# Patient Record
Sex: FEMALE | Race: WHITE | NOT HISPANIC OR LATINO | Employment: OTHER | URBAN - METROPOLITAN AREA
[De-identification: names, ages, dates, MRNs, and addresses within clinical notes are randomized per-mention and may not be internally consistent; named-entity substitution may affect disease eponyms.]

---

## 2019-04-12 ENCOUNTER — HOSPITAL ENCOUNTER (INPATIENT)
Facility: HOSPITAL | Age: 84
LOS: 4 days | Discharge: NURSING, REHAB OR LONG-TERM CARE | DRG: 481 | End: 2019-04-16
Attending: EMERGENCY MEDICINE | Admitting: ORTHOPAEDIC SURGERY
Payer: MEDICARE

## 2019-04-12 ENCOUNTER — ANESTHESIA EVENT (OUTPATIENT)
Dept: SURGERY | Facility: HOSPITAL | Age: 84
DRG: 481 | End: 2019-04-12
Payer: MEDICARE

## 2019-04-12 DIAGNOSIS — S52.572A OTHER CLOSED INTRA-ARTICULAR FRACTURE OF DISTAL END OF LEFT RADIUS, INITIAL ENCOUNTER: ICD-10-CM

## 2019-04-12 DIAGNOSIS — S72.22XA CLOSED DISPLACED SUBTROCHANTERIC FRACTURE OF LEFT FEMUR, INITIAL ENCOUNTER: Primary | ICD-10-CM

## 2019-04-12 DIAGNOSIS — S72.002A CLOSED FRACTURE OF LEFT HIP, INITIAL ENCOUNTER: ICD-10-CM

## 2019-04-12 DIAGNOSIS — Z01.818 PREOP EXAMINATION: ICD-10-CM

## 2019-04-12 LAB
ABO + RH BLD: NORMAL
ALBUMIN SERPL BCP-MCNC: 4 G/DL (ref 3.5–5.2)
ALP SERPL-CCNC: 58 U/L (ref 55–135)
ALT SERPL W/O P-5'-P-CCNC: 15 U/L (ref 10–44)
ANION GAP SERPL CALC-SCNC: 13 MMOL/L (ref 8–16)
APTT BLDCRRT: 21.7 SEC (ref 21–32)
AST SERPL-CCNC: 18 U/L (ref 10–40)
BASOPHILS # BLD AUTO: 0.01 K/UL (ref 0–0.2)
BASOPHILS NFR BLD: 0.2 % (ref 0–1.9)
BILIRUB SERPL-MCNC: 0.2 MG/DL (ref 0.1–1)
BILIRUB UR QL STRIP: NEGATIVE
BLD GP AB SCN CELLS X3 SERPL QL: NORMAL
BUN SERPL-MCNC: 21 MG/DL (ref 8–23)
CALCIUM SERPL-MCNC: 9.7 MG/DL (ref 8.7–10.5)
CHLORIDE SERPL-SCNC: 105 MMOL/L (ref 95–110)
CLARITY UR: CLEAR
CO2 SERPL-SCNC: 24 MMOL/L (ref 23–29)
COLOR UR: YELLOW
CREAT SERPL-MCNC: 0.8 MG/DL (ref 0.5–1.4)
DIFFERENTIAL METHOD: ABNORMAL
EOSINOPHIL # BLD AUTO: 0 K/UL (ref 0–0.5)
EOSINOPHIL NFR BLD: 0.5 % (ref 0–8)
ERYTHROCYTE [DISTWIDTH] IN BLOOD BY AUTOMATED COUNT: 13.2 % (ref 11.5–14.5)
EST. GFR  (AFRICAN AMERICAN): >60 ML/MIN/1.73 M^2
EST. GFR  (NON AFRICAN AMERICAN): >60 ML/MIN/1.73 M^2
GLUCOSE SERPL-MCNC: 189 MG/DL (ref 70–110)
GLUCOSE UR QL STRIP: NEGATIVE
HCT VFR BLD AUTO: 33.1 % (ref 37–48.5)
HGB BLD-MCNC: 10.8 G/DL (ref 12–16)
HGB UR QL STRIP: NEGATIVE
INR PPP: 1 (ref 0.8–1.2)
KETONES UR QL STRIP: NEGATIVE
LEUKOCYTE ESTERASE UR QL STRIP: ABNORMAL
LYMPHOCYTES # BLD AUTO: 1.2 K/UL (ref 1–4.8)
LYMPHOCYTES NFR BLD: 21.5 % (ref 18–48)
MCH RBC QN AUTO: 30.5 PG (ref 27–31)
MCHC RBC AUTO-ENTMCNC: 32.6 G/DL (ref 32–36)
MCV RBC AUTO: 94 FL (ref 82–98)
MICROSCOPIC COMMENT: ABNORMAL
MONOCYTES # BLD AUTO: 0.4 K/UL (ref 0.3–1)
MONOCYTES NFR BLD: 6.9 % (ref 4–15)
NEUTROPHILS # BLD AUTO: 3.9 K/UL (ref 1.8–7.7)
NEUTROPHILS NFR BLD: 70.5 % (ref 38–73)
NITRITE UR QL STRIP: NEGATIVE
PH UR STRIP: 6 [PH] (ref 5–8)
PLATELET # BLD AUTO: 178 K/UL (ref 150–350)
PMV BLD AUTO: 9.9 FL (ref 9.2–12.9)
POTASSIUM SERPL-SCNC: 4.2 MMOL/L (ref 3.5–5.1)
PROT SERPL-MCNC: 6.9 G/DL (ref 6–8.4)
PROT UR QL STRIP: NEGATIVE
PROTHROMBIN TIME: 10 SEC (ref 9–12.5)
RBC # BLD AUTO: 3.54 M/UL (ref 4–5.4)
SODIUM SERPL-SCNC: 142 MMOL/L (ref 136–145)
SP GR UR STRIP: >=1.03 (ref 1–1.03)
URN SPEC COLLECT METH UR: ABNORMAL
UROBILINOGEN UR STRIP-ACNC: NEGATIVE EU/DL
WBC # BLD AUTO: 5.53 K/UL (ref 3.9–12.7)
WBC #/AREA URNS HPF: 10 /HPF (ref 0–5)

## 2019-04-12 PROCEDURE — 85610 PROTHROMBIN TIME: CPT

## 2019-04-12 PROCEDURE — 80053 COMPREHEN METABOLIC PANEL: CPT

## 2019-04-12 PROCEDURE — 25000003 PHARM REV CODE 250: Performed by: ORTHOPAEDIC SURGERY

## 2019-04-12 PROCEDURE — 51702 INSERT TEMP BLADDER CATH: CPT

## 2019-04-12 PROCEDURE — 81000 URINALYSIS NONAUTO W/SCOPE: CPT

## 2019-04-12 PROCEDURE — 99285 EMERGENCY DEPT VISIT HI MDM: CPT | Mod: 25

## 2019-04-12 PROCEDURE — 96375 TX/PRO/DX INJ NEW DRUG ADDON: CPT

## 2019-04-12 PROCEDURE — 94761 N-INVAS EAR/PLS OXIMETRY MLT: CPT

## 2019-04-12 PROCEDURE — 11000001 HC ACUTE MED/SURG PRIVATE ROOM

## 2019-04-12 PROCEDURE — 86920 COMPATIBILITY TEST SPIN: CPT

## 2019-04-12 PROCEDURE — 86850 RBC ANTIBODY SCREEN: CPT

## 2019-04-12 PROCEDURE — 96374 THER/PROPH/DIAG INJ IV PUSH: CPT

## 2019-04-12 PROCEDURE — 85025 COMPLETE CBC W/AUTO DIFF WBC: CPT

## 2019-04-12 PROCEDURE — 93005 ELECTROCARDIOGRAM TRACING: CPT

## 2019-04-12 PROCEDURE — 93010 ELECTROCARDIOGRAM REPORT: CPT | Mod: ,,, | Performed by: INTERNAL MEDICINE

## 2019-04-12 PROCEDURE — 85730 THROMBOPLASTIN TIME PARTIAL: CPT

## 2019-04-12 PROCEDURE — 93010 EKG 12-LEAD: ICD-10-PCS | Mod: ,,, | Performed by: INTERNAL MEDICINE

## 2019-04-12 PROCEDURE — 63600175 PHARM REV CODE 636 W HCPCS: Performed by: EMERGENCY MEDICINE

## 2019-04-12 RX ORDER — ONDANSETRON 2 MG/ML
4 INJECTION INTRAMUSCULAR; INTRAVENOUS EVERY 8 HOURS PRN
Status: DISCONTINUED | OUTPATIENT
Start: 2019-04-12 | End: 2019-04-16 | Stop reason: HOSPADM

## 2019-04-12 RX ORDER — LEVOTHYROXINE SODIUM 50 UG/1
50 TABLET ORAL DAILY
Status: DISCONTINUED | OUTPATIENT
Start: 2019-04-13 | End: 2019-04-16 | Stop reason: HOSPADM

## 2019-04-12 RX ORDER — MORPHINE SULFATE 2 MG/ML
3 INJECTION, SOLUTION INTRAMUSCULAR; INTRAVENOUS
Status: DISCONTINUED | OUTPATIENT
Start: 2019-04-12 | End: 2019-04-16 | Stop reason: HOSPADM

## 2019-04-12 RX ORDER — HYDROCODONE BITARTRATE AND ACETAMINOPHEN 10; 325 MG/1; MG/1
1 TABLET ORAL EVERY 4 HOURS PRN
Status: DISCONTINUED | OUTPATIENT
Start: 2019-04-12 | End: 2019-04-16 | Stop reason: HOSPADM

## 2019-04-12 RX ORDER — MORPHINE SULFATE 2 MG/ML
2 INJECTION, SOLUTION INTRAMUSCULAR; INTRAVENOUS
Status: COMPLETED | OUTPATIENT
Start: 2019-04-12 | End: 2019-04-12

## 2019-04-12 RX ORDER — LEVOTHYROXINE SODIUM 50 UG/1
50 TABLET ORAL DAILY
COMMUNITY

## 2019-04-12 RX ORDER — OLMESARTAN MEDOXOMIL 20 MG/1
10 TABLET ORAL DAILY
COMMUNITY

## 2019-04-12 RX ORDER — PRAVASTATIN SODIUM 20 MG/1
40 TABLET ORAL DAILY
Status: DISCONTINUED | OUTPATIENT
Start: 2019-04-13 | End: 2019-04-16 | Stop reason: HOSPADM

## 2019-04-12 RX ORDER — SODIUM CHLORIDE, SODIUM LACTATE, POTASSIUM CHLORIDE, CALCIUM CHLORIDE 600; 310; 30; 20 MG/100ML; MG/100ML; MG/100ML; MG/100ML
INJECTION, SOLUTION INTRAVENOUS CONTINUOUS
Status: DISCONTINUED | OUTPATIENT
Start: 2019-04-12 | End: 2019-04-15

## 2019-04-12 RX ORDER — AMOXICILLIN 250 MG
1 CAPSULE ORAL 2 TIMES DAILY
Status: DISCONTINUED | OUTPATIENT
Start: 2019-04-12 | End: 2019-04-16 | Stop reason: HOSPADM

## 2019-04-12 RX ORDER — ENOXAPARIN SODIUM 100 MG/ML
30 INJECTION SUBCUTANEOUS EVERY 12 HOURS
Status: DISCONTINUED | OUTPATIENT
Start: 2019-04-13 | End: 2019-04-16 | Stop reason: HOSPADM

## 2019-04-12 RX ORDER — HYDROCODONE BITARTRATE AND ACETAMINOPHEN 5; 325 MG/1; MG/1
1 TABLET ORAL EVERY 4 HOURS PRN
Status: DISCONTINUED | OUTPATIENT
Start: 2019-04-12 | End: 2019-04-16 | Stop reason: HOSPADM

## 2019-04-12 RX ORDER — LOSARTAN POTASSIUM 25 MG/1
25 TABLET ORAL DAILY
Status: DISCONTINUED | OUTPATIENT
Start: 2019-04-13 | End: 2019-04-16 | Stop reason: HOSPADM

## 2019-04-12 RX ORDER — ACETAMINOPHEN 325 MG/1
650 TABLET ORAL EVERY 8 HOURS PRN
Status: DISCONTINUED | OUTPATIENT
Start: 2019-04-12 | End: 2019-04-16 | Stop reason: HOSPADM

## 2019-04-12 RX ORDER — CEFAZOLIN SODIUM 2 G/50ML
2 SOLUTION INTRAVENOUS
Status: DISCONTINUED | OUTPATIENT
Start: 2019-04-12 | End: 2019-04-13

## 2019-04-12 RX ORDER — PRAVASTATIN SODIUM 40 MG/1
40 TABLET ORAL DAILY
COMMUNITY

## 2019-04-12 RX ORDER — ONDANSETRON 2 MG/ML
4 INJECTION INTRAMUSCULAR; INTRAVENOUS
Status: COMPLETED | OUTPATIENT
Start: 2019-04-12 | End: 2019-04-12

## 2019-04-12 RX ORDER — CALCIUM CARBONATE 600 MG
600 TABLET ORAL ONCE
COMMUNITY

## 2019-04-12 RX ADMIN — STANDARDIZED SENNA CONCENTRATE AND DOCUSATE SODIUM 1 TABLET: 8.6; 5 TABLET, FILM COATED ORAL at 08:04

## 2019-04-12 RX ADMIN — SODIUM CHLORIDE, SODIUM LACTATE, POTASSIUM CHLORIDE, AND CALCIUM CHLORIDE 100 ML/HR: .6; .31; .03; .02 INJECTION, SOLUTION INTRAVENOUS at 06:04

## 2019-04-12 RX ADMIN — ONDANSETRON 4 MG: 2 INJECTION INTRAMUSCULAR; INTRAVENOUS at 04:04

## 2019-04-12 RX ADMIN — MORPHINE SULFATE 2 MG: 2 INJECTION, SOLUTION INTRAMUSCULAR; INTRAVENOUS at 04:04

## 2019-04-12 RX ADMIN — ACETAMINOPHEN 650 MG: 325 TABLET ORAL at 06:04

## 2019-04-12 NOTE — H&P
[facility] H&P    Consult:   L hip fx    HPI:  85yF was dancing at a wedding and fell onto L hip and sustained a L hip subtroch fx. Denies any numbness/ tingling LLE. Pain localized only to L hip. No f/c    PMH:   Past Medical History:   Diagnosis Date    Coronary artery disease     Diabetes mellitus     Borderline , not on any treatment    Hypertension     Thyroid disease        PSH:    has a past surgical history that includes L Knee replacement (Left).    SOCIAL:   Social History     Socioeconomic History    Marital status:      Spouse name: Not on file    Number of children: Not on file    Years of education: Not on file    Highest education level: Not on file   Occupational History    Not on file   Social Needs    Financial resource strain: Not on file    Food insecurity:     Worry: Not on file     Inability: Not on file    Transportation needs:     Medical: Not on file     Non-medical: Not on file   Tobacco Use    Smoking status: Not on file   Substance and Sexual Activity    Alcohol use: Not on file    Drug use: Not on file    Sexual activity: Not on file   Lifestyle    Physical activity:     Days per week: Not on file     Minutes per session: Not on file    Stress: Not on file   Relationships    Social connections:     Talks on phone: Not on file     Gets together: Not on file     Attends Religion service: Not on file     Active member of club or organization: Not on file     Attends meetings of clubs or organizations: Not on file     Relationship status: Not on file   Other Topics Concern    Not on file   Social History Narrative    Not on file            MEDS:   No current facility-administered medications on file prior to encounter.      Current Outpatient Medications on File Prior to Encounter   Medication Sig Dispense Refill    calcium carbonate (OS-YANETH) 600 mg calcium (1,500 mg) Tab Take 600 mg by mouth once.      levothyroxine (SYNTHROID) 50 MCG tablet Take 50 mcg by  mouth once daily.      olmesartan (BENICAR) 20 MG tablet Take 10 mg by mouth once daily.      pravastatin (PRAVACHOL) 40 MG tablet Take 40 mg by mouth once daily.         ALLERGY:   Allergies as of 04/12/2019    (No Known Allergies)       LAST MEAL: 4/12 noon    Vitals:  BP (!) 148/67   Pulse 68   Temp 98.6 °F (37 °C) (Oral)   Resp 19   SpO2 99%     Labs:  Recent Labs   Lab 04/12/19  1529   WBC 5.53   HGB 10.8*   HCT 33.1*      MCV 94   RDW 13.2      K 4.2      CO2 24   BUN 21   CREATININE 0.8   *   PROT 6.9   ALBUMIN 4.0   BILITOT 0.2   AST 18   ALKPHOS 58   ALT 15       Coags:   Lab Results   Component Value Date    INR 1.0 04/12/2019    APTT 21.7 04/12/2019         Exam:  NAD  No resp dist  RRR per DP pulse    LLE:  DP 2+  SILT t/s/s/dp/sp  Motor intact ta/gs/ehl/fhl  No wounds over hip  Well healed L knee incision consistent w/ TKA  Pain w/ logroll     Radiology:  XR L hip: L subtroch femur fx    Impression:  85yF w/ L subtroch femur fx above a L TKA    Plan:  Will order XR L femur and L knee  NWB LLE  R/b/a of op and non-op discussed. Elected to proceed w/ op intervention  - Plan for OR tomorrow for L CMN first case  - Regular diet now, NPo after midnight  -  Escobedo insertion  - H/H 10.8/33  - DVT ppx - lovenox  - Abx - Ancef on call for OR  Weight Bearing Status: Bedrest    I agree with findings outlined by the resident.   I met and examined the patient. Plan for CMN left femur this morning.

## 2019-04-12 NOTE — SIGNIFICANT EVENT
Family medicine was called to evaluate Pola Shah by the ED.      Date for Evaluation: 4/12/2019      HPI: I had seen this pleasant 85 y.o. female in the ED today for evaluation of left hip fracture  Sustained today while dancing     Active Cardiac Conditions: Her history is not suggestive of unstable coronary syndrome, decompensated heart failure, significant arrhythmias, severe valvular disease.    Exercise Tolerance: She can undertake activities such as cooking, cleaning, vacuuming, showering, dressing, shopping, walking 1-2 blocks, flights of stairs, golfing and mowing without chest pain or shortness of breath making her exercise tolerance more than 4 METs.     Clinical Cardiac Risk Factors: She does not have a history of Coronary Arterty Disease, Congestive Heart Failure, Cerebral Vascular Disease, Diabetes Mellitus and Renal Impairment    Current Anticoagulants and Antiplatelet Therapy: No    ROS    Constitutional: Appetite and weight stable.  HEENT: No double, blurring of vision.   Cardiac: As stated above.  Respiratory: Negative for cough, phlegm, shortness of breath.  GI: Negative for nausea, vomiting, diarrhea.  Gu: No Dysuria, no Hematuria.  MS: Muscle ache: Back pain, joint pain/swelling.  Neuro: No numbness, tingling or weakness.  Endocrine: Not a Diabetic.  Derm: No rashes.  Heme/Onc:  No easy bruising. No bleeding complications with previous surgeries.        Past Surgical History:   Procedure Laterality Date    L Knee replacement Left        SOCIAL HISTORY  Social History     Tobacco Use    Smoking status: Not on file   Substance Use Topics    Alcohol use: Not on file    Drug use: Not on file       History reviewed. No pertinent family history.         MEDICATION HISTORY  No current facility-administered medications for this encounter.      Current Outpatient Medications   Medication Sig    calcium carbonate (OS-YANETH) 600 mg calcium (1,500 mg) Tab Take 600 mg by mouth once.     levothyroxine (SYNTHROID) 50 MCG tablet Take 50 mcg by mouth once daily.    olmesartan (BENICAR) 20 MG tablet Take 10 mg by mouth once daily.    pravastatin (PRAVACHOL) 40 MG tablet Take 40 mg by mouth once daily.       ALLERGIES   Review of patient's allergies indicates:  No Known Allergies     VITALS  Vitals:    04/12/19 1427   BP: (!) 146/67   Pulse: 67   Resp: 15   Temp: 97.8 °F (36.6 °C)         PHYSICAL EXAMINATION    General:  Well developed, well nourished, no acute distress   Eye: Pupils equal, reactive to light, no conjunctival icterus.   Neck: No thyromegaly, carotid bruit, JVD  Heart: Normal S1 S2, no murmurs, or gallops  Respiratory: normal respiratory effort, bilateral equal air entry.   Abdomen: Soft, non-tender, liver and spleen not palpable  Lymphatic: No cervical, axillary, inguinal, lymphadenopathy   Musculoskeletal: left leg shortened and externally rotated   Neurological: Conscious, coherent, equal strength, bilaterally on both upper and lower extremities    LAB STUDIES    Blood Tests    CBC:  Recent Labs     04/12/19  1529   WBC 5.53   HGB 10.8*   HCT 33.1*          No results for input(s): CREATININE in the last 72 hours.      Coags:  No results for input(s): INR, APTT in the last 72 hours.    Invalid input(s): PRO    EKG: reviewed by me         ASSESSMENT AND PLAN    Pola Shah was seen in the pre-operative clinic today.    Pre-operative cardiac risk assessment:     She does not have any active cardiac conditions, clinical cardiac risk factors and her exercise tolerance is more than 4 METS.   She  carries an intermediate cardiac risk and she is at intermediate risk for the procedure.     Family medicine can be consulted for the management of hypertension    D'Amico C Johnson, MD  4/12/2019  [facility] HO-1

## 2019-04-12 NOTE — ED PROVIDER NOTES
Encounter Date: 4/12/2019    SCRIBE #1 NOTE: I, Hermilo Mayfield, am scribing for, and in the presence of,  Dr. Knott. I have scribed the entire note.       History     Chief Complaint   Patient presents with    Hip Pain     left hip injury after fall at a hotel. + shortening noted. pt immobilized pta.      Pola Shah is a 85 y.o. female who presents to the ED due to hip pain s/p fall with onset just PTA. The patient's family reports the patient slipped and fell while at a wedding at a hotel. The patient presents with left hip pain with shortening and arrived immobilized.    The history is provided by the patient. The history is limited by a language barrier.     Review of patient's allergies indicates:  No Known Allergies  Past Medical History:   Diagnosis Date    Coronary artery disease     Diabetes mellitus     Borderline , not on any treatment    Hypertension     Thyroid disease      Past Surgical History:   Procedure Laterality Date    L Knee replacement Left      History reviewed. No pertinent family history.  Social History     Tobacco Use    Smoking status: Not on file   Substance Use Topics    Alcohol use: Not on file    Drug use: Not on file     Review of Systems   Constitutional: Negative for fever.   HENT: Negative for facial swelling and sore throat.    Eyes: Negative for pain and redness.   Respiratory: Negative for shortness of breath.    Cardiovascular: Negative for chest pain.   Gastrointestinal: Negative for abdominal pain and nausea.   Genitourinary: Negative for dysuria and hematuria.   Musculoskeletal: Negative for back pain and neck pain.        Left hip pain   Skin: Negative for rash.   Neurological: Negative for seizures and facial asymmetry.       Physical Exam     Initial Vitals [04/12/19 1427]   BP Pulse Resp Temp SpO2   (!) 146/67 67 15 97.8 °F (36.6 °C) 100 %      MAP       --         Physical Exam    Nursing note and vitals reviewed.  Constitutional: She appears  well-developed and well-nourished. She is not diaphoretic. No distress.   HENT:   Head: Normocephalic and atraumatic.   Eyes: Conjunctivae and EOM are normal.   Neck: Normal range of motion. Neck supple.   Cardiovascular: Normal rate.   Pulmonary/Chest: Breath sounds normal. No respiratory distress.   Abdominal: Soft. There is no tenderness.   Musculoskeletal: She exhibits tenderness (To left hip area).   Left leg shortened and externally rotated   Neurological: She is alert and oriented to person, place, and time.   Skin: Skin is warm and dry.         ED Course   Procedures  Labs Reviewed   COMPREHENSIVE METABOLIC PANEL - Abnormal; Notable for the following components:       Result Value    Glucose 189 (*)     All other components within normal limits   CBC W/ AUTO DIFFERENTIAL - Abnormal; Notable for the following components:    RBC 3.54 (*)     Hemoglobin 10.8 (*)     Hematocrit 33.1 (*)     All other components within normal limits   PROTIME-INR   APTT   URINALYSIS, REFLEX TO URINE CULTURE   TYPE & SCREEN        ECG Results          EKG 12-lead (In process)  Result time 04/12/19 16:16:51    In process by Interface, Lab In [facility] (04/12/19 16:16:51)                 Narrative:    Test Reason : Z01.818,    Vent. Rate : 061 BPM     Atrial Rate : 061 BPM     P-R Int : 198 ms          QRS Dur : 076 ms      QT Int : 402 ms       P-R-T Axes : 053 047 050 degrees     QTc Int : 404 ms    Normal sinus rhythm  Septal infarct ,age undetermined  Abnormal ECG  No previous ECGs available    Referred By: [email or web address]   SELF           Confirmed By:                             Imaging Results          X-Ray Hip 1 View Left (Final result)  Result time 04/12/19 15:45:11    Final result by Gumaro Jackson Jr., MD (04/12/19 15:45:11)                 Impression:      Comminuted intertrochanteric fracture as above.      Electronically signed by: Gumaro Jackson MD  Date:    04/12/2019  Time:    15:45             Narrative:     EXAMINATION:  XR HIP 1 VIEW LEFT    CLINICAL HISTORY:  fall;    TECHNIQUE:  Single AP view left hip.    COMPARISON:  None    FINDINGS:  There is a comminuted intertrochanteric fracture.  The lesser and greater trochanters appear to be free fragments.  Femoral head remains within the acetabulum on this single projection.                                 Medical Decision Making:   Clinical Tests:   Lab Tests: Ordered and Reviewed  The following lab test(s) were unremarkable: CBC, CMP, Urinalysis and Troponin  Radiological Study: Reviewed and Ordered  Medical Tests: Ordered and Reviewed                   ED Course as of Apr 12 1708 Fri Apr 12, 2019 1707 Case discussed with [facility].  They recommend consulting [facility].  [facility] consulted.  They came and evaluated the patient and states the patient is medically cleared for surgery.  This information was relayed to the [facility] resident.    [ST]      ED Course User Index  [ST] Radha Knott MD     Clinical Impression:       ICD-10-CM ICD-9-CM   1. Closed fracture of left hip, initial encounter S72.002A 820.8   2. Preop examination Z01.818 V72.84             I, Radha Knott, personally performed the services described in this documentation. All medical record entries made by the scribe were at my direction and in my presence.  I have reviewed the chart and agree that the record reflects my personal performance and is accurate and complete. Radha Knott M.D. 5:07 PM04/12/2019               Radha Knott MD  04/12/19 1708

## 2019-04-12 NOTE — ED NOTES
APPEARANCE: Alert, oriented and in no acute distress.  CARDIAC: Normal rate and rhythm, no murmur heard.   PERIPHERAL VASCULAR: peripheral pulses present. Normal cap refill. No edema. Warm to touch.Pedal pulses present.  RESPIRATORY:Normal rate and effort, breath sounds clear bilaterally throughout chest. Respirations are equal and unlabored no obvious signs of distress.  GASTRO: soft, bowel sounds normal, no tenderness, no abdominal distention.  MUSC: Full ROM. No bony tenderness or soft tissue tenderness. No obvious deformity.  SKIN: Skin is warm and dry, normal skin turgor, mucous membranes moist.  NEURO: 5/5 strength major flexors/extensors bilaterally. Sensory intact to light touch bilaterally. Tierra coma scale: eyes open spontaneously-4, oriented & converses-5, obeys commands-6. No neurological abnormalities.   MENTAL STATUS: awake, alert and aware of environment.  EYE: PERRL, both eyes: pupils brisk and reactive to light. Normal size.  ENT: EARS: no obvious drainage. NOSE: no active bleeding.

## 2019-04-12 NOTE — ED NOTES
Kerry consulted re : transfer back to [place] post op.  aware , transfer facilitator will discuss with the family and facilitate transfer once the patient is on the floor post op. Family aware.

## 2019-04-13 ENCOUNTER — ANESTHESIA (OUTPATIENT)
Dept: SURGERY | Facility: HOSPITAL | Age: 84
DRG: 481 | End: 2019-04-13
Payer: MEDICARE

## 2019-04-13 LAB
ANION GAP SERPL CALC-SCNC: 10 MMOL/L (ref 8–16)
BASOPHILS # BLD AUTO: 0.01 K/UL (ref 0–0.2)
BASOPHILS NFR BLD: 0.1 % (ref 0–1.9)
BUN SERPL-MCNC: 13 MG/DL (ref 8–23)
CALCIUM SERPL-MCNC: 8.6 MG/DL (ref 8.7–10.5)
CHLORIDE SERPL-SCNC: 104 MMOL/L (ref 95–110)
CO2 SERPL-SCNC: 24 MMOL/L (ref 23–29)
CREAT SERPL-MCNC: 0.8 MG/DL (ref 0.5–1.4)
DIFFERENTIAL METHOD: ABNORMAL
EOSINOPHIL # BLD AUTO: 0 K/UL (ref 0–0.5)
EOSINOPHIL NFR BLD: 0.2 % (ref 0–8)
ERYTHROCYTE [DISTWIDTH] IN BLOOD BY AUTOMATED COUNT: 13.3 % (ref 11.5–14.5)
EST. GFR  (AFRICAN AMERICAN): >60 ML/MIN/1.73 M^2
EST. GFR  (NON AFRICAN AMERICAN): >60 ML/MIN/1.73 M^2
GLUCOSE SERPL-MCNC: 169 MG/DL (ref 70–110)
HCT VFR BLD AUTO: 28.5 % (ref 37–48.5)
HGB BLD-MCNC: 9.2 G/DL (ref 12–16)
LYMPHOCYTES # BLD AUTO: 1.3 K/UL (ref 1–4.8)
LYMPHOCYTES NFR BLD: 12.5 % (ref 18–48)
MCH RBC QN AUTO: 30.5 PG (ref 27–31)
MCHC RBC AUTO-ENTMCNC: 32.3 G/DL (ref 32–36)
MCV RBC AUTO: 94 FL (ref 82–98)
MONOCYTES # BLD AUTO: 0.7 K/UL (ref 0.3–1)
MONOCYTES NFR BLD: 6.4 % (ref 4–15)
NEUTROPHILS # BLD AUTO: 8.4 K/UL (ref 1.8–7.7)
NEUTROPHILS NFR BLD: 80.3 % (ref 38–73)
PLATELET # BLD AUTO: 147 K/UL (ref 150–350)
PMV BLD AUTO: 8.9 FL (ref 9.2–12.9)
POTASSIUM SERPL-SCNC: 3.8 MMOL/L (ref 3.5–5.1)
RBC # BLD AUTO: 3.02 M/UL (ref 4–5.4)
SODIUM SERPL-SCNC: 138 MMOL/L (ref 136–145)
WBC # BLD AUTO: 10.42 K/UL (ref 3.9–12.7)

## 2019-04-13 PROCEDURE — 80048 BASIC METABOLIC PNL TOTAL CA: CPT

## 2019-04-13 PROCEDURE — 97530 THERAPEUTIC ACTIVITIES: CPT

## 2019-04-13 PROCEDURE — 36415 COLL VENOUS BLD VENIPUNCTURE: CPT

## 2019-04-13 PROCEDURE — 37000008 HC ANESTHESIA 1ST 15 MINUTES: Performed by: ORTHOPAEDIC SURGERY

## 2019-04-13 PROCEDURE — 63600175 PHARM REV CODE 636 W HCPCS: Performed by: ANESTHESIOLOGY

## 2019-04-13 PROCEDURE — 63600175 PHARM REV CODE 636 W HCPCS: Performed by: ORTHOPAEDIC SURGERY

## 2019-04-13 PROCEDURE — C1769 GUIDE WIRE: HCPCS | Performed by: ORTHOPAEDIC SURGERY

## 2019-04-13 PROCEDURE — 37000009 HC ANESTHESIA EA ADD 15 MINS: Performed by: ORTHOPAEDIC SURGERY

## 2019-04-13 PROCEDURE — 27000221 HC OXYGEN, UP TO 24 HOURS

## 2019-04-13 PROCEDURE — 85025 COMPLETE CBC W/AUTO DIFF WBC: CPT

## 2019-04-13 PROCEDURE — 71000033 HC RECOVERY, INTIAL HOUR: Performed by: ORTHOPAEDIC SURGERY

## 2019-04-13 PROCEDURE — 25000003 PHARM REV CODE 250: Performed by: ANESTHESIOLOGY

## 2019-04-13 PROCEDURE — 36000711: Performed by: ORTHOPAEDIC SURGERY

## 2019-04-13 PROCEDURE — 36000710: Performed by: ORTHOPAEDIC SURGERY

## 2019-04-13 PROCEDURE — 25000003 PHARM REV CODE 250: Performed by: ORTHOPAEDIC SURGERY

## 2019-04-13 PROCEDURE — 27201423 OPTIME MED/SURG SUP & DEVICES STERILE SUPPLY: Performed by: ORTHOPAEDIC SURGERY

## 2019-04-13 PROCEDURE — 97163 PT EVAL HIGH COMPLEX 45 MIN: CPT

## 2019-04-13 PROCEDURE — 11000001 HC ACUTE MED/SURG PRIVATE ROOM

## 2019-04-13 PROCEDURE — C1713 ANCHOR/SCREW BN/BN,TIS/BN: HCPCS | Performed by: ORTHOPAEDIC SURGERY

## 2019-04-13 PROCEDURE — 71000039 HC RECOVERY, EACH ADD'L HOUR: Performed by: ORTHOPAEDIC SURGERY

## 2019-04-13 DEVICE — SCREW LOCKING T2 F/T 5X47.5MM: Type: IMPLANTABLE DEVICE | Site: HIP | Status: FUNCTIONAL

## 2019-04-13 DEVICE — KIT NAIL IM GAMMA 11X380MM TI: Type: IMPLANTABLE DEVICE | Site: HIP | Status: FUNCTIONAL

## 2019-04-13 DEVICE — SCREW LAG TITANIUM 10.5X95: Type: IMPLANTABLE DEVICE | Site: HIP | Status: FUNCTIONAL

## 2019-04-13 RX ORDER — ONDANSETRON 2 MG/ML
4 INJECTION INTRAMUSCULAR; INTRAVENOUS DAILY PRN
Status: DISCONTINUED | OUTPATIENT
Start: 2019-04-13 | End: 2019-04-13 | Stop reason: HOSPADM

## 2019-04-13 RX ORDER — DEXAMETHASONE SODIUM PHOSPHATE 4 MG/ML
INJECTION, SOLUTION INTRA-ARTICULAR; INTRALESIONAL; INTRAMUSCULAR; INTRAVENOUS; SOFT TISSUE
Status: DISCONTINUED | OUTPATIENT
Start: 2019-04-13 | End: 2019-04-13

## 2019-04-13 RX ORDER — LIDOCAINE HCL/PF 100 MG/5ML
SYRINGE (ML) INTRAVENOUS
Status: DISCONTINUED | OUTPATIENT
Start: 2019-04-13 | End: 2019-04-13

## 2019-04-13 RX ORDER — ROCURONIUM BROMIDE 10 MG/ML
INJECTION, SOLUTION INTRAVENOUS
Status: DISCONTINUED | OUTPATIENT
Start: 2019-04-13 | End: 2019-04-13

## 2019-04-13 RX ORDER — SODIUM CHLORIDE 0.9 % (FLUSH) 0.9 %
10 SYRINGE (ML) INJECTION
Status: DISCONTINUED | OUTPATIENT
Start: 2019-04-13 | End: 2019-04-13 | Stop reason: HOSPADM

## 2019-04-13 RX ORDER — SUCCINYLCHOLINE CHLORIDE 20 MG/ML
INJECTION INTRAMUSCULAR; INTRAVENOUS
Status: DISCONTINUED | OUTPATIENT
Start: 2019-04-13 | End: 2019-04-13

## 2019-04-13 RX ORDER — FENTANYL CITRATE 50 UG/ML
INJECTION, SOLUTION INTRAMUSCULAR; INTRAVENOUS
Status: DISCONTINUED | OUTPATIENT
Start: 2019-04-13 | End: 2019-04-13

## 2019-04-13 RX ORDER — KETOROLAC TROMETHAMINE 30 MG/ML
15 INJECTION, SOLUTION INTRAMUSCULAR; INTRAVENOUS EVERY 6 HOURS
Status: COMPLETED | OUTPATIENT
Start: 2019-04-13 | End: 2019-04-14

## 2019-04-13 RX ORDER — SODIUM CHLORIDE, SODIUM LACTATE, POTASSIUM CHLORIDE, CALCIUM CHLORIDE 600; 310; 30; 20 MG/100ML; MG/100ML; MG/100ML; MG/100ML
INJECTION, SOLUTION INTRAVENOUS CONTINUOUS PRN
Status: DISCONTINUED | OUTPATIENT
Start: 2019-04-13 | End: 2019-04-13

## 2019-04-13 RX ORDER — GLYCOPYRROLATE 0.2 MG/ML
INJECTION INTRAMUSCULAR; INTRAVENOUS
Status: DISCONTINUED | OUTPATIENT
Start: 2019-04-13 | End: 2019-04-13

## 2019-04-13 RX ORDER — HYDROMORPHONE HYDROCHLORIDE 2 MG/ML
0.5 INJECTION, SOLUTION INTRAMUSCULAR; INTRAVENOUS; SUBCUTANEOUS EVERY 5 MIN PRN
Status: DISCONTINUED | OUTPATIENT
Start: 2019-04-13 | End: 2019-04-13 | Stop reason: HOSPADM

## 2019-04-13 RX ORDER — CEFAZOLIN SODIUM 2 G/50ML
2 SOLUTION INTRAVENOUS
Status: DISCONTINUED | OUTPATIENT
Start: 2019-04-13 | End: 2019-04-13

## 2019-04-13 RX ORDER — NEOSTIGMINE METHYLSULFATE 1 MG/ML
INJECTION, SOLUTION INTRAVENOUS
Status: DISCONTINUED | OUTPATIENT
Start: 2019-04-13 | End: 2019-04-13

## 2019-04-13 RX ORDER — CEFAZOLIN SODIUM 1 G/3ML
INJECTION, POWDER, FOR SOLUTION INTRAMUSCULAR; INTRAVENOUS
Status: DISCONTINUED | OUTPATIENT
Start: 2019-04-13 | End: 2019-04-13

## 2019-04-13 RX ORDER — ONDANSETRON 2 MG/ML
INJECTION INTRAMUSCULAR; INTRAVENOUS
Status: DISCONTINUED | OUTPATIENT
Start: 2019-04-13 | End: 2019-04-13

## 2019-04-13 RX ORDER — PROPOFOL 10 MG/ML
VIAL (ML) INTRAVENOUS
Status: DISCONTINUED | OUTPATIENT
Start: 2019-04-13 | End: 2019-04-13

## 2019-04-13 RX ADMIN — PROPOFOL 10 MG: 10 INJECTION, EMULSION INTRAVENOUS at 07:04

## 2019-04-13 RX ADMIN — ONDANSETRON 4 MG: 2 INJECTION, SOLUTION INTRAMUSCULAR; INTRAVENOUS at 09:04

## 2019-04-13 RX ADMIN — FENTANYL CITRATE 25 MCG: 50 INJECTION, SOLUTION INTRAMUSCULAR; INTRAVENOUS at 09:04

## 2019-04-13 RX ADMIN — SUCCINYLCHOLINE CHLORIDE 120 MG: 20 INJECTION, SOLUTION INTRAMUSCULAR; INTRAVENOUS at 07:04

## 2019-04-13 RX ADMIN — ROCURONIUM BROMIDE 5 MG: 10 INJECTION, SOLUTION INTRAVENOUS at 07:04

## 2019-04-13 RX ADMIN — GLYCOPYRROLATE 0.7 MG: 0.2 INJECTION, SOLUTION INTRAMUSCULAR; INTRAVENOUS at 09:04

## 2019-04-13 RX ADMIN — FENTANYL CITRATE 100 MCG: 50 INJECTION, SOLUTION INTRAMUSCULAR; INTRAVENOUS at 07:04

## 2019-04-13 RX ADMIN — HYDROCODONE BITARTRATE AND ACETAMINOPHEN 1 TABLET: 5; 325 TABLET ORAL at 10:04

## 2019-04-13 RX ADMIN — SODIUM CHLORIDE, SODIUM LACTATE, POTASSIUM CHLORIDE, AND CALCIUM CHLORIDE: .6; .31; .03; .02 INJECTION, SOLUTION INTRAVENOUS at 07:04

## 2019-04-13 RX ADMIN — CEFAZOLIN 2 G: 330 INJECTION, POWDER, FOR SOLUTION INTRAMUSCULAR; INTRAVENOUS at 08:04

## 2019-04-13 RX ADMIN — HYDROMORPHONE HYDROCHLORIDE 0.5 MG: 2 INJECTION INTRAMUSCULAR; INTRAVENOUS; SUBCUTANEOUS at 10:04

## 2019-04-13 RX ADMIN — FENTANYL CITRATE 50 MCG: 50 INJECTION, SOLUTION INTRAMUSCULAR; INTRAVENOUS at 08:04

## 2019-04-13 RX ADMIN — ROCURONIUM BROMIDE 30 MG: 10 INJECTION, SOLUTION INTRAVENOUS at 08:04

## 2019-04-13 RX ADMIN — SODIUM CHLORIDE, SODIUM LACTATE, POTASSIUM CHLORIDE, AND CALCIUM CHLORIDE: .6; .31; .03; .02 INJECTION, SOLUTION INTRAVENOUS at 06:04

## 2019-04-13 RX ADMIN — KETOROLAC TROMETHAMINE 15 MG: 30 INJECTION, SOLUTION INTRAMUSCULAR at 03:04

## 2019-04-13 RX ADMIN — NEOSTIGMINE METHYLSULFATE 4.5 MG: 1 INJECTION INTRAVENOUS at 09:04

## 2019-04-13 RX ADMIN — ENOXAPARIN SODIUM 30 MG: 100 INJECTION SUBCUTANEOUS at 09:04

## 2019-04-13 RX ADMIN — DEXTROSE 2 G: 50 INJECTION, SOLUTION INTRAVENOUS at 06:04

## 2019-04-13 RX ADMIN — FENTANYL CITRATE 50 MCG: 50 INJECTION, SOLUTION INTRAMUSCULAR; INTRAVENOUS at 09:04

## 2019-04-13 RX ADMIN — MORPHINE SULFATE 3 MG: 2 INJECTION, SOLUTION INTRAMUSCULAR; INTRAVENOUS at 04:04

## 2019-04-13 RX ADMIN — LIDOCAINE HYDROCHLORIDE 80 MG: 20 INJECTION, SOLUTION INTRAVENOUS at 07:04

## 2019-04-13 RX ADMIN — PROPOFOL 30 MG: 10 INJECTION, EMULSION INTRAVENOUS at 07:04

## 2019-04-13 RX ADMIN — HYDROCODONE BITARTRATE AND ACETAMINOPHEN 1 TABLET: 10; 325 TABLET ORAL at 02:04

## 2019-04-13 RX ADMIN — STANDARDIZED SENNA CONCENTRATE AND DOCUSATE SODIUM 1 TABLET: 8.6; 5 TABLET, FILM COATED ORAL at 09:04

## 2019-04-13 NOTE — OP NOTE
4/13/2019    Orthopaedic Surgery Service Operative Note    Attending Physician: Justus Vences MD     First Assistant: Hardik Shah MD     Pre-Op Diagnosis: Left Subtrochanteric hip fracture     Post-Op Diagnosis: same     Procedure: Left hip cephlomedullary nail     Estimated Blood Loss: 100cc    Complications: none     Specimens:none    Drains: none     Findings: Left reverse obliquity subtroch fx    Implants: Milena Gamma Nail     Indication: Patient is a85yF who was dancing at a wedding and tipped and fell onto her left hip. She sustained a left subtroch femur fracture. The risks, benefits, and alternatives of op and non-op were discussed and she elected to proceed w/ op intervention.    Procedure: The patient was taken to the OR and given general anesthesia. She was placed on the fracture table in the scissor position. A closed reduction of the left hip fracture was obtained. Prophylactic antibiotic were given. A time-out was done. The left hip was prepped and draped in the usual sterile fashion.   We made an incision over the greater trochater. A guidewire was placed on the tip of the trochanter. This was inserted with fluoroscopy and then overdrilled. A ball tipped guidewire was placed down the femur. The guidewire was checked on the AP and lateral to ensure that the guidewire was in the center of the femur to not disrupt the total knee arthroplasty that is present. The femur was reamed to 13 mm. The nail length was measured and an 11 x 380 Gamma nail was inserted. Using fluoroscopy we then inserted the lag screw into the head over a guidewire measuring 95mm using a center-center technique. A set screw was placed locking the nail proximally. A lateral of the knee was checked with the nail in place and noted to be posterior to the anterior phalang of the total knee replacement. We then locked the nail distally using the perfect circles technique. Wounds were irrigated out and closed with vicryl and  monocryl. The patient was taken off the fracture table and then to recovery in stable condition.     Plan:  Patient will be WBAT LLE immediately. We will get her up w/ PT/OT. We will give her 2 weeks of Lovenox and then baby aspirin for DVT ppx post-op. We will work on discharge back to [place] when patient is stable.

## 2019-04-13 NOTE — PLAN OF CARE
Problem: Adult Inpatient Plan of Care  Goal: Plan of Care Review  Outcome: Ongoing (interventions implemented as appropriate)  Patient aaox4, safety measures implemented, call light in reach, bed in lowest position, patient has been NPO since midnight, pain controlled with ordered medication, will continue to monitor.

## 2019-04-13 NOTE — PT/OT/SLP EVAL
Physical Therapy Evaluation    Patient Name:  Pola Shah   MRN:  [id number]    Recommendations:     Discharge Recommendations:  (TBD)   Discharge Equipment Recommendations: (TBD)   Barriers to discharge: None    Assessment:     Pola Shah is a 85 y.o. female admitted with a medical diagnosis of <principal problem not specified>.  She presents with the following impairments/functional limitations:  weakness, impaired self care skills, impaired balance, decreased ROM, impaired skin, impaired joint extensibility, impaired endurance, impaired functional mobilty, decreased upper extremity function, pain, impaired coordination, impaired muscle length, gait instability, decreased lower extremity function, orthopedic precautions Pt c/ low activity tolerance d/t pain c/ max A x2 supine <> sit; Dc rec TBD. LLE WBAT; also L colles #; DME: TBD pending WB status per ortho MD.    Rehab Prognosis: Good; patient would benefit from acute skilled PT services to address these deficits and reach maximum level of function.    Recent Surgery: Procedure(s) (LRB):  Left hip CMN (Left) Day of Surgery    Plan:     During this hospitalization, patient to be seen BID(M-F BID; wkend-daily) to address the identified rehab impairments via therapeutic exercises, therapeutic activities, gait training, neuromuscular re-education and progress toward the following goals:    · Plan of Care Expires:  05/13/19    Subjective     Chief Complaint: pain in L hip  Patient/Family Comments/goals: agreed to PT POC  Pain/Comfort:  · Pain Rating 1: 9/10(5/10 at rest)  · Location - Side 1: Left  · Location - Orientation 1: proximal  · Location 1: hip  · Pain Addressed 1: Reposition, Distraction, Cessation of Activity, Pre-medicate for activity, Nurse notified  · Pain Rating Post-Intervention 1: 5/10  · Pain Rating 2: 6/10  · Location - Side 2: Left  · Location - Orientation 2: distal  · Location 2: arm  · Pain Addressed 2: Other (see comments)(no WB  pending ortho assessment)    Patients cultural, spiritual, Denominational conflicts given the current situation: no    Living Environment:  Lives in [facility] c/ family  Prior to admission, patients level of function was Mod I.  Equipment used at home: none.  DME owned (not currently used): none.  Upon discharge, patient will have assistance from family.    Objective:     Communicated with RN prior to session.  Patient found supine with bed alarm, telemetry  upon PT entry to room.    General Precautions: Standard, fall   Orthopedic Precautions:LUE non weight bearing, LLE weight bearing as tolerated(LUE WB pending MD clearance)   Braces: N/A     Exams:  · Cognitive Exam:  Patient is oriented to Person, Place, Time, Situation and anxious  · Gross Motor Coordination:  impaired;- LLE  · RLE ROM: WFL  · RLE Strength: 3+/5- limited by pain in LLE  · LLE ROM: Deficits: L hip not tested; funcitonally knee/ankle- WFL  · LLE Strength: Deficits: L hip not tested; funcitonally knee/ankle- 2+/5    Functional Mobility:  · Bed Mobility:     · Rolling Right: maximal assistance and of 2 persons  · Scooting: maximal assistance and of 2 persons  · Supine to Sit: maximal assistance and of 2 persons  · Sit to Supine: maximal assistance and of 2 persons  · Transfers:     · Sit to Stand:  unable to assess       Therapeutic Activities and Exercises:  PT eval completed; c/ progressive mobility; pt able to tolerate sitting EOB ,1min; mobility is limited by significant pain despite pain medication administered prior to Pt session.    AM-PAC 6 CLICK MOBILITY  Total Score:8     Patient left supine with all lines intact, call button in reach, bed alarm on and RN notified.    GOALS:   Multidisciplinary Problems     Physical Therapy Goals        Problem: Physical Therapy Goal    Goal Priority Disciplines Outcome Goal Variances Interventions   Physical Therapy Goal     PT, PT/OT Ongoing (interventions implemented as appropriate)     Description:  Goals to  be met by: MALA     Patient will increase functional independence with mobility by performin. Supine to sit with MInimal Assistance  To assess sit<>stand; gait as tolerated and establish goals.                      History:     Past Medical History:   Diagnosis Date    Coronary artery disease     Diabetes mellitus     Borderline , not on any treatment    Hypertension     Thyroid disease        Past Surgical History:   Procedure Laterality Date    L Knee replacement Left        Time Tracking:     PT Received On: 19  PT Start Time: 1523     PT Stop Time: 1549  PT Total Time (min): 26 min     Billable Minutes: Evaluation 10 and Therapeutic Activity 16      Danyel Candelaria, PT  2019

## 2019-04-13 NOTE — INTERVAL H&P NOTE
The patient has been examined and the H&P has been reviewed:    I concur with the findings and no changes have occurred since H&P was written.    Anesthesia/Surgery risks, benefits and alternative options discussed and understood by patient/family.          Active Hospital Problems    Diagnosis  POA    Closed fracture of left hip [S72.002A]  Yes      Resolved Hospital Problems   No resolved problems to display.

## 2019-04-13 NOTE — ANESTHESIA PREPROCEDURE EVALUATION
04/12/2019  Pola Shah is a 85 y.o., female w hx of hypothyroid, HTN, HLD, osteoporosis who suffered L subtroch femur fx scheduled for L hip CMN    Pt had L TKA under unknown anesthesia type in [place] within last few years, tolerated well per family. Pt is from [place] and in town for weekend for wedding    Anesthesia Evaluation    I have reviewed the Patient Summary Reports.    I have reviewed the Nursing Notes.      Review of Systems  Anesthesia Hx:  Denies Family Hx of Anesthesia complications.    Hematology/Oncology:         -- Anemia:   EENT/Dental:EENT/Dental Normal   Cardiovascular:   Exercise tolerance: good Hypertension Pt reports able to work up 2 flights of stairs without CP or SOB   Pulmonary:  Pulmonary Normal    Renal/:  Renal/ Normal     Hepatic/GI:  Hepatic/GI Normal    Neurological:  Neurology Normal    Endocrine:   Diabetes Hypothyroidism        Physical Exam   Airway/Jaw/Neck:  Airway Findings: Mouth Opening: Normal Tongue: Normal  Mallampati: III  TM Distance: Normal, at least 6 cm  Jaw/Neck Findings:  Limited Ability to Prognath  Neck ROM: Normal ROM      Dental:  Dental Findings: Lower partial dentures   Chest/Lungs:  Chest/Lungs Findings: Clear to auscultation     Heart/Vascular:  Heart Findings: Rate: Normal  Rhythm: Regular Rhythm             Anesthesia Plan  Type of Anesthesia, risks & benefits discussed:  Anesthesia Type:  general, MAC, regional, spinal  Patient's Preference:   Intra-op Monitoring Plan: standard ASA monitors  Intra-op Monitoring Plan Comments:   Post Op Pain Control Plan:   Post Op Pain Control Plan Comments:   Induction:   IV  Beta Blocker:         Informed Consent: Patient representative understands risks and agrees with Anesthesia plan.  Questions answered. Anesthesia consent signed with patient representative.  ASA Score: 3     Day of Surgery Review  of History & Physical:            Ready For Surgery From Anesthesia Perspective.      Lab Results   Component Value Date    WBC 5.53 04/12/2019    HGB 10.8 (L) 04/12/2019    HCT 33.1 (L) 04/12/2019     04/12/2019    ALT 15 04/12/2019    AST 18 04/12/2019     04/12/2019    K 4.2 04/12/2019     04/12/2019    CREATININE 0.8 04/12/2019    BUN 21 04/12/2019    CO2 24 04/12/2019    INR 1.0 04/12/2019

## 2019-04-13 NOTE — PLAN OF CARE
Progress notes reviewed. Evening rounds completed.Admit questions and med rec completed . Admit was completed with family translating in [place] . Introduced self as VN for this shift. Educated pt on VN's role in patient's care.  Plan of care reviewed with patientand family . Opportunity given for pt's questions. No questions or concerns expressed at this time. VN to continue to monitor.

## 2019-04-13 NOTE — PLAN OF CARE
Problem: Adult Inpatient Plan of Care  Goal: Plan of Care Review  Outcome: Ongoing (interventions implemented as appropriate)  Pt AAOx4, VSS, NAD noted, no complaints of n/v/d, complaints of pain relieved with PRN medication, IV fluids infusing per order, family at bedside, tolerating regular diet, bed alarm active, safety has been maintained, will continue to monitor.

## 2019-04-13 NOTE — PLAN OF CARE
sdigned out from recovery phase per Dr Trinh. Report called to Bernice Randall/REECE. Transported to room 533 via bed,sr upx4.

## 2019-04-13 NOTE — ANESTHESIA POSTPROCEDURE EVALUATION
Anesthesia Post Evaluation    Patient: Pola Shah    Procedure(s) Performed: Procedure(s) (LRB):  Left hip CMN (Left)    Final Anesthesia Type: general  Patient location during evaluation: PACU  Patient participation: Yes- Able to Participate  Level of consciousness: awake and alert  Post-procedure vital signs: reviewed and stable  Pain management: adequate  Airway patency: patent  PONV status at discharge: No PONV  Anesthetic complications: no      Cardiovascular status: blood pressure returned to baseline and hemodynamically stable  Respiratory status: unassisted  Hydration status: euvolemic  Follow-up not needed.          Vitals Value Taken Time   /76 4/13/2019 12:01 PM   Temp 36.3 °C (97.3 °F) 4/13/2019 10:50 AM   Pulse 63 4/13/2019 10:51 AM   Resp 18 4/13/2019 10:50 AM   SpO2 99 % 4/13/2019 12:01 PM   Vitals shown include unvalidated device data.      Event Time     Out of Recovery 04/13/2019 10:50:00          Pain/Marc Score: Pain Rating Prior to Med Admin: 9 (4/13/2019  3:22 PM)  Pain Rating Post Med Admin: 2 (4/13/2019 10:40 AM)  Marc Score: 8 (4/13/2019 10:40 AM)

## 2019-04-13 NOTE — BRIEF OP NOTE
[facility]  Brief Operative Note    SUMMARY     Surgery Date: 4/13/2019     Surgeon(s) and Role:     * Justus Vences MD - Primary    Assisting Surgeon: None    Pre-op Diagnosis:  Closed displaced subtrochanteric fracture of left femur, initial encounter [S72.22XA]    Post-op Diagnosis:  Post-Op Diagnosis Codes:     * Closed displaced subtrochanteric fracture of left femur, initial encounter [S72.22XA]    Procedure(s) (LRB):  Left hip CMN (Left)    Anesthesia: General    Description of Procedure: L hip CMN    Description of the findings of the procedure: L subtroch femur fx above L TKA    Estimated Blood Loss: 100cc    Implants: [place] Gamma 11x380; 95mm lag screw         Specimens:   Specimen (12h ago, onward)    None

## 2019-04-13 NOTE — PROGRESS NOTES
[facility] progress Note    Interval:   NAEO. Pain controlled. NPO. OR today for L hip CMN    Vitals:  Temp:  [97.4 °F (36.3 °C)-98.6 °F (37 °C)] 97.9 °F (36.6 °C)  Pulse:  [59-74] 74  Resp:  [15-20] 18  SpO2:  [93 %-100 %] 96 %  BP: (140-170)/(64-75) 170/75    Scheduled Meds:    enoxaparin  30 mg Subcutaneous Q12H    levothyroxine  50 mcg Oral Daily    losartan  25 mg Oral Daily    pravastatin  40 mg Oral Daily    senna-docusate 8.6-50 mg  1 tablet Oral BID     Continuous Infusions:    lactated ringers 100 mL/hr (04/12/19 1833)     PRN Meds: acetaminophen, ceFAZolin (ANCEF) IVPB, HYDROcodone-acetaminophen, HYDROcodone-acetaminophen, morphine, ondansetron    Diet: Diet NPO    Trended Lab Data:  Recent Labs   Lab 04/12/19  1529   WBC 5.53   HGB 10.8*   HCT 33.1*      MCV 94   RDW 13.2      K 4.2      CO2 24   BUN 21   CREATININE 0.8   *   PROT 6.9   ALBUMIN 4.0   BILITOT 0.2   AST 18   ALKPHOS 58   ALT 15       I/O last 3 completed shifts:  In: 600 [P.O.:600]  Out: 600 [Urine:600]    Exam:  NAD  No resp dist  RRR per DP    LLE:  DP 2+  SILT t/s/s/dp/sp  Motor intact ta/gs/ehl/fhl  No wounds over hip  Well healed L knee incision consistent w/ TKA  Pain w/ logroll      Radiology:  XR L hip: L subtroch femur fx  XR L knee: L TKA, CR w/o stemmed implant     Impression:  85yF w/ L subtroch femur fx above a L TKA     Plan:  OR today for L CMN  NWB LLE  R/b/a of op and non-op discussed. Elected to proceed w/ op intervention  - Plan for OR tomorrow for L CMN first case  - NPo now  -  Escobedo insertion  - H/H 10.8/33  - DVT ppx - lovenox  - Abx - Ancef on call for OR  Weight Bearing Status: Bedrest

## 2019-04-13 NOTE — PROGRESS NOTES
Pt back from surgery, AAOx4, VSS, NAD noted, no complaints of n/v/d or pain, dressing clean/dry/intact, family at bedside, IV fluids infusing per order, bed alarm active, safety has been maintained, will continue to monitor.

## 2019-04-13 NOTE — PLAN OF CARE
Problem: Physical Therapy Goal  Goal: Physical Therapy Goal  Goals to be met by: DC     Patient will increase functional independence with mobility by performin. Supine to sit with MInimal Assistance  To assess sit<>stand; gait as tolerated and establish goals.    Outcome: Ongoing (interventions implemented as appropriate)  Pt c/ low activity tolerance d/t pain; Dc rec TBD.  LLE WBAT s/p sx; also c/ L colles #; DME: TBD pending WB status per ortho MD.

## 2019-04-13 NOTE — TRANSFER OF CARE
"Anesthesia Transfer of Care Note    Patient: Pola Shah    Procedure(s) Performed: Procedure(s) (LRB):  Left hip CMN (Left)    Patient location: PACU    Anesthesia Type: general    Transport from OR: Transported from OR on 6-10 L/min O2 by face mask with adequate spontaneous ventilation    Post pain: pain needs to be addressed    Post assessment: no apparent anesthetic complications    Post vital signs: stable    Level of consciousness: awake, oriented and alert    Nausea/Vomiting: no nausea/vomiting    Complications: laryngospasm which was treated and broken with jaw thrust          Last vitals:   Visit Vitals  BP (!) 190/85 (BP Location: Left arm, Patient Position: Lying)   Pulse 90   Temp 36.6 °C (97.9 °F) (Oral)   Resp 18   Ht 5' 2" (1.575 m)   Wt 61.4 kg (135 lb 5.8 oz)   SpO2 96%   Breastfeeding? No   BMI 24.76 kg/m²     "

## 2019-04-13 NOTE — PROGRESS NOTES
POST OP NOTE    Patient tolerated the procedure well. Awake from anesthesia with pain controled.  S/p L hip CMN    Vitals:    04/13/19 0427   BP: (!) 170/75   Pulse: 74   Resp: 18   Temp: 97.9 °F (36.6 °C)       Lab Results   Component Value Date    HGB 10.8 (L) 04/12/2019     Lab Results   Component Value Date    HCT 33.1 (L) 04/12/2019       Post OP films: Pending    Exam:   NAD  No resp dist  RRR per DP    LLE  SILT t/s/s/dp/sp  Motor intact ta/gs/ehl/fhl  DP 2+  Surgical dressings in place      85yF S/P L hip CMN  -transfer from PACU to floor when stable  - Post-op XR pending  - DVT ppx: lovenox  - Abx - Ancef x 2 doses  WBAT LLE  PT/OT  H/H pending  XR pending    I agree with findings outlined by the resident.

## 2019-04-14 PROCEDURE — 97530 THERAPEUTIC ACTIVITIES: CPT

## 2019-04-14 PROCEDURE — 63600175 PHARM REV CODE 636 W HCPCS: Performed by: ORTHOPAEDIC SURGERY

## 2019-04-14 PROCEDURE — 97116 GAIT TRAINING THERAPY: CPT

## 2019-04-14 PROCEDURE — 94761 N-INVAS EAR/PLS OXIMETRY MLT: CPT

## 2019-04-14 PROCEDURE — 27000221 HC OXYGEN, UP TO 24 HOURS

## 2019-04-14 PROCEDURE — 25000003 PHARM REV CODE 250: Performed by: ORTHOPAEDIC SURGERY

## 2019-04-14 PROCEDURE — 97165 OT EVAL LOW COMPLEX 30 MIN: CPT

## 2019-04-14 PROCEDURE — 11000001 HC ACUTE MED/SURG PRIVATE ROOM

## 2019-04-14 RX ADMIN — ENOXAPARIN SODIUM 30 MG: 100 INJECTION SUBCUTANEOUS at 10:04

## 2019-04-14 RX ADMIN — ENOXAPARIN SODIUM 30 MG: 100 INJECTION SUBCUTANEOUS at 08:04

## 2019-04-14 RX ADMIN — KETOROLAC TROMETHAMINE 15 MG: 30 INJECTION, SOLUTION INTRAMUSCULAR at 05:04

## 2019-04-14 RX ADMIN — PRAVASTATIN SODIUM 40 MG: 40 TABLET ORAL at 10:04

## 2019-04-14 RX ADMIN — ACETAMINOPHEN 650 MG: 325 TABLET ORAL at 05:04

## 2019-04-14 RX ADMIN — LOSARTAN POTASSIUM 25 MG: 25 TABLET ORAL at 10:04

## 2019-04-14 RX ADMIN — SODIUM CHLORIDE, SODIUM LACTATE, POTASSIUM CHLORIDE, AND CALCIUM CHLORIDE: .6; .31; .03; .02 INJECTION, SOLUTION INTRAVENOUS at 06:04

## 2019-04-14 RX ADMIN — KETOROLAC TROMETHAMINE 15 MG: 30 INJECTION, SOLUTION INTRAMUSCULAR at 11:04

## 2019-04-14 RX ADMIN — HYDROCODONE BITARTRATE AND ACETAMINOPHEN 1 TABLET: 5; 325 TABLET ORAL at 07:04

## 2019-04-14 RX ADMIN — LEVOTHYROXINE SODIUM 50 MCG: 50 TABLET ORAL at 10:04

## 2019-04-14 RX ADMIN — KETOROLAC TROMETHAMINE 15 MG: 30 INJECTION, SOLUTION INTRAMUSCULAR at 12:04

## 2019-04-14 RX ADMIN — STANDARDIZED SENNA CONCENTRATE AND DOCUSATE SODIUM 1 TABLET: 8.6; 5 TABLET, FILM COATED ORAL at 10:04

## 2019-04-14 RX ADMIN — STANDARDIZED SENNA CONCENTRATE AND DOCUSATE SODIUM 1 TABLET: 8.6; 5 TABLET, FILM COATED ORAL at 08:04

## 2019-04-15 LAB
ANION GAP SERPL CALC-SCNC: 8 MMOL/L (ref 8–16)
ANION GAP SERPL CALC-SCNC: 8 MMOL/L (ref 8–16)
BASOPHILS # BLD AUTO: 0.01 K/UL (ref 0–0.2)
BASOPHILS # BLD AUTO: 0.01 K/UL (ref 0–0.2)
BASOPHILS NFR BLD: 0.1 % (ref 0–1.9)
BASOPHILS NFR BLD: 0.2 % (ref 0–1.9)
BLD PROD TYP BPU: NORMAL
BLD PROD TYP BPU: NORMAL
BLOOD UNIT EXPIRATION DATE: NORMAL
BLOOD UNIT EXPIRATION DATE: NORMAL
BLOOD UNIT TYPE CODE: 7300
BLOOD UNIT TYPE CODE: 7300
BLOOD UNIT TYPE: NORMAL
BLOOD UNIT TYPE: NORMAL
BUN SERPL-MCNC: 12 MG/DL (ref 8–23)
BUN SERPL-MCNC: 12 MG/DL (ref 8–23)
CALCIUM SERPL-MCNC: 8.2 MG/DL (ref 8.7–10.5)
CALCIUM SERPL-MCNC: 8.3 MG/DL (ref 8.7–10.5)
CHLORIDE SERPL-SCNC: 103 MMOL/L (ref 95–110)
CHLORIDE SERPL-SCNC: 103 MMOL/L (ref 95–110)
CO2 SERPL-SCNC: 26 MMOL/L (ref 23–29)
CO2 SERPL-SCNC: 27 MMOL/L (ref 23–29)
CODING SYSTEM: NORMAL
CODING SYSTEM: NORMAL
CREAT SERPL-MCNC: 0.7 MG/DL (ref 0.5–1.4)
CREAT SERPL-MCNC: 0.7 MG/DL (ref 0.5–1.4)
DIFFERENTIAL METHOD: ABNORMAL
DIFFERENTIAL METHOD: ABNORMAL
DISPENSE STATUS: NORMAL
DISPENSE STATUS: NORMAL
EOSINOPHIL # BLD AUTO: 0.1 K/UL (ref 0–0.5)
EOSINOPHIL # BLD AUTO: 0.2 K/UL (ref 0–0.5)
EOSINOPHIL NFR BLD: 1.8 % (ref 0–8)
EOSINOPHIL NFR BLD: 3 % (ref 0–8)
ERYTHROCYTE [DISTWIDTH] IN BLOOD BY AUTOMATED COUNT: 13.5 % (ref 11.5–14.5)
ERYTHROCYTE [DISTWIDTH] IN BLOOD BY AUTOMATED COUNT: 14.4 % (ref 11.5–14.5)
EST. GFR  (AFRICAN AMERICAN): >60 ML/MIN/1.73 M^2
EST. GFR  (AFRICAN AMERICAN): >60 ML/MIN/1.73 M^2
EST. GFR  (NON AFRICAN AMERICAN): >60 ML/MIN/1.73 M^2
EST. GFR  (NON AFRICAN AMERICAN): >60 ML/MIN/1.73 M^2
GLUCOSE SERPL-MCNC: 156 MG/DL (ref 70–110)
GLUCOSE SERPL-MCNC: 157 MG/DL (ref 70–110)
HCT VFR BLD AUTO: 19.3 % (ref 37–48.5)
HCT VFR BLD AUTO: 28.2 % (ref 37–48.5)
HGB BLD-MCNC: 6.2 G/DL (ref 12–16)
HGB BLD-MCNC: 9.4 G/DL (ref 12–16)
LYMPHOCYTES # BLD AUTO: 1.2 K/UL (ref 1–4.8)
LYMPHOCYTES # BLD AUTO: 1.2 K/UL (ref 1–4.8)
LYMPHOCYTES NFR BLD: 16 % (ref 18–48)
LYMPHOCYTES NFR BLD: 20.3 % (ref 18–48)
MCH RBC QN AUTO: 30 PG (ref 27–31)
MCH RBC QN AUTO: 30.2 PG (ref 27–31)
MCHC RBC AUTO-ENTMCNC: 32.1 G/DL (ref 32–36)
MCHC RBC AUTO-ENTMCNC: 33.3 G/DL (ref 32–36)
MCV RBC AUTO: 90 FL (ref 82–98)
MCV RBC AUTO: 94 FL (ref 82–98)
MONOCYTES # BLD AUTO: 0.5 K/UL (ref 0.3–1)
MONOCYTES # BLD AUTO: 0.6 K/UL (ref 0.3–1)
MONOCYTES NFR BLD: 7.7 % (ref 4–15)
MONOCYTES NFR BLD: 8 % (ref 4–15)
NEUTROPHILS # BLD AUTO: 3.9 K/UL (ref 1.8–7.7)
NEUTROPHILS # BLD AUTO: 5.5 K/UL (ref 1.8–7.7)
NEUTROPHILS NFR BLD: 68.2 % (ref 38–73)
NEUTROPHILS NFR BLD: 73.5 % (ref 38–73)
PLATELET # BLD AUTO: 113 K/UL (ref 150–350)
PLATELET # BLD AUTO: 126 K/UL (ref 150–350)
PMV BLD AUTO: 9.5 FL (ref 9.2–12.9)
PMV BLD AUTO: 9.6 FL (ref 9.2–12.9)
POTASSIUM SERPL-SCNC: 4.1 MMOL/L (ref 3.5–5.1)
POTASSIUM SERPL-SCNC: 4.2 MMOL/L (ref 3.5–5.1)
RBC # BLD AUTO: 2.05 M/UL (ref 4–5.4)
RBC # BLD AUTO: 3.13 M/UL (ref 4–5.4)
SODIUM SERPL-SCNC: 137 MMOL/L (ref 136–145)
SODIUM SERPL-SCNC: 138 MMOL/L (ref 136–145)
TRANS ERYTHROCYTES VOL PATIENT: NORMAL ML
TRANS ERYTHROCYTES VOL PATIENT: NORMAL ML
WBC # BLD AUTO: 5.75 K/UL (ref 3.9–12.7)
WBC # BLD AUTO: 7.42 K/UL (ref 3.9–12.7)

## 2019-04-15 PROCEDURE — P9021 RED BLOOD CELLS UNIT: HCPCS

## 2019-04-15 PROCEDURE — 25000003 PHARM REV CODE 250: Performed by: ORTHOPAEDIC SURGERY

## 2019-04-15 PROCEDURE — 11000001 HC ACUTE MED/SURG PRIVATE ROOM

## 2019-04-15 PROCEDURE — 97116 GAIT TRAINING THERAPY: CPT

## 2019-04-15 PROCEDURE — 27000221 HC OXYGEN, UP TO 24 HOURS

## 2019-04-15 PROCEDURE — 97530 THERAPEUTIC ACTIVITIES: CPT

## 2019-04-15 PROCEDURE — 80048 BASIC METABOLIC PNL TOTAL CA: CPT

## 2019-04-15 PROCEDURE — 97110 THERAPEUTIC EXERCISES: CPT

## 2019-04-15 PROCEDURE — 80048 BASIC METABOLIC PNL TOTAL CA: CPT | Mod: 91

## 2019-04-15 PROCEDURE — 97165 OT EVAL LOW COMPLEX 30 MIN: CPT

## 2019-04-15 PROCEDURE — 36430 TRANSFUSION BLD/BLD COMPNT: CPT

## 2019-04-15 PROCEDURE — 94761 N-INVAS EAR/PLS OXIMETRY MLT: CPT

## 2019-04-15 PROCEDURE — 36415 COLL VENOUS BLD VENIPUNCTURE: CPT

## 2019-04-15 PROCEDURE — 85025 COMPLETE CBC W/AUTO DIFF WBC: CPT | Mod: 91

## 2019-04-15 PROCEDURE — 63600175 PHARM REV CODE 636 W HCPCS: Performed by: ORTHOPAEDIC SURGERY

## 2019-04-15 RX ORDER — ENOXAPARIN SODIUM 100 MG/ML
40 INJECTION SUBCUTANEOUS DAILY
Qty: 5.6 ML | Refills: 0 | Status: SHIPPED | OUTPATIENT
Start: 2019-04-15 | End: 2019-04-15 | Stop reason: HOSPADM

## 2019-04-15 RX ORDER — DOCUSATE SODIUM 100 MG/1
100 CAPSULE, LIQUID FILLED ORAL 2 TIMES DAILY
Status: DISCONTINUED | OUTPATIENT
Start: 2019-04-15 | End: 2019-04-16 | Stop reason: HOSPADM

## 2019-04-15 RX ORDER — HYDROCODONE BITARTRATE AND ACETAMINOPHEN 500; 5 MG/1; MG/1
TABLET ORAL
Status: DISCONTINUED | OUTPATIENT
Start: 2019-04-15 | End: 2019-04-16 | Stop reason: HOSPADM

## 2019-04-15 RX ORDER — HYDROCODONE BITARTRATE AND ACETAMINOPHEN 5; 325 MG/1; MG/1
1 TABLET ORAL EVERY 6 HOURS PRN
Qty: 28 TABLET | Refills: 0 | Status: SHIPPED | OUTPATIENT
Start: 2019-04-15

## 2019-04-15 RX ORDER — BISACODYL 10 MG
10 SUPPOSITORY, RECTAL RECTAL ONCE
Status: COMPLETED | OUTPATIENT
Start: 2019-04-15 | End: 2019-04-15

## 2019-04-15 RX ORDER — TRAMADOL HYDROCHLORIDE 50 MG/1
50 TABLET ORAL EVERY 8 HOURS PRN
Qty: 30 TABLET | Refills: 0 | Status: SHIPPED | OUTPATIENT
Start: 2019-04-15

## 2019-04-15 RX ORDER — ENOXAPARIN SODIUM 100 MG/ML
40 INJECTION SUBCUTANEOUS DAILY
Qty: 1 SYRINGE | Refills: 0 | Status: SHIPPED | OUTPATIENT
Start: 2019-04-15 | End: 2019-04-29

## 2019-04-15 RX ORDER — TRAMADOL HYDROCHLORIDE 50 MG/1
50 TABLET ORAL EVERY 8 HOURS PRN
Status: DISCONTINUED | OUTPATIENT
Start: 2019-04-15 | End: 2019-04-16 | Stop reason: HOSPADM

## 2019-04-15 RX ADMIN — LEVOTHYROXINE SODIUM 50 MCG: 50 TABLET ORAL at 09:04

## 2019-04-15 RX ADMIN — ENOXAPARIN SODIUM 30 MG: 100 INJECTION SUBCUTANEOUS at 09:04

## 2019-04-15 RX ADMIN — TRAMADOL HYDROCHLORIDE 50 MG: 50 TABLET, FILM COATED ORAL at 06:04

## 2019-04-15 RX ADMIN — ACETAMINOPHEN 650 MG: 325 TABLET ORAL at 01:04

## 2019-04-15 RX ADMIN — STANDARDIZED SENNA CONCENTRATE AND DOCUSATE SODIUM 1 TABLET: 8.6; 5 TABLET, FILM COATED ORAL at 09:04

## 2019-04-15 RX ADMIN — BISACODYL 10 MG: 10 SUPPOSITORY RECTAL at 02:04

## 2019-04-15 RX ADMIN — PRAVASTATIN SODIUM 40 MG: 40 TABLET ORAL at 09:04

## 2019-04-15 RX ADMIN — DOCUSATE SODIUM 100 MG: 100 CAPSULE, LIQUID FILLED ORAL at 02:04

## 2019-04-15 RX ADMIN — LOSARTAN POTASSIUM 25 MG: 25 TABLET ORAL at 09:04

## 2019-04-15 NOTE — PLAN OF CARE
Problem: Physical Therapy Goal  Goal: Physical Therapy Goal  Goals to be met by: DC     Patient will increase functional independence with mobility by performin. Supine to sit with MInimal Assistance  To assess sit<>stand; gait as tolerated and establish goals.  New LTG's:  1.  Ambulate 100' with platform RW or hemiwalker with LLE WBAT and LUE WB through platform only with supervision.  2.  Bed to chair transfer with CG with LLE WBAT and LUE WB through platform walker only.      Outcome: Ongoing (interventions implemented as appropriate)  Pt continues to work and progress toward established goals. Able to perform ambulation training this treatment ~15 ft with HHA and mod A of 2.

## 2019-04-15 NOTE — PLAN OF CARE
TN met with pt's son Dr. Pat Shah  [phone]      per son -- pt will transfer (per [facility]) to [facility] in [place], [place]  --    Address: [address] , [place] [address]  P     TN spoke with intake person Candida Mcginnis  [phone] p     fax #  [phone]  info will be routed to her as requested.       TN will get quotes for [facility] for son.     Call  [phone]  5 [phone]  Per Fitz   -- direct  #  [phone]     F #  [phone]        -------------------------------------------------     [facility]:    per [facility]:    [facility] (dispatches flight care)    [phone]   -- Anthony Rossi     -- quotes:  [phone]      [facility] air [facility] -- TN spoke Sam -- she will contact pt's son with a quote.     1755 -- TN met with pt - wants to use [facility] -- TN called and spoke with Will -- advised him that son wants to use the company subcontracted with [facility].  Will will have the billing dept call him to get payment info.    TN left VM for Karyn at  and Candida  with info re:  prob d/c to [place] tomorrow, Tues 4/16.      04/15/19 1054   Discharge Assessment   Assessment Type Discharge Planning Assessment

## 2019-04-15 NOTE — PLAN OF CARE
Problem: Occupational Therapy Goal  Goal: Occupational Therapy Goal  Goals to be met by: 05/15     Patient will increase functional independence with ADLs by performing:    UE Dressing with Moderate Assistance.  LE Dressing with Moderate Assistance.  Grooming while seated with Minimal Assistance.  Toileting from toilet with Moderate Assistance for hygiene and clothing management.   Toilet transfer to toilet with Minimal Assistance.  Increased functional strength to WFL for ADLs.     Outcome: Ongoing (interventions implemented as appropriate)  Pt found in supine & agreeable to OT/PT co-tx this AM w/ son & DIL acting as interpreters. Pt perf the following: sup-->EOB w/ Mod A x 2 & tyler'd ~15 min for AA/AROM B UEs 3x10 reps ea; standing via HW w/ Min A x 1, however pt unable to properly use HW; standing via platform RW w/ Min A to achieve/main f/b t/f-->BSC w/ Mod A; toileting w/ Max A. Pt returned to supine w/ Mod A x 2 due to signif sleepiness. Edu/tx re: HEP; ortho precautions, pain/edema mgmt & general safety techs. Pt/fly verbalized understanding.    Pt w/o c/o pain this date, but limited mobility 2/2 fear & discomfort from constipation. Pt cont w/ good potential for return to PLOF w/ cont OT per POC.

## 2019-04-15 NOTE — PT/OT/SLP PROGRESS
Occupational Therapy   Treatment    Name: Pola Shah  MRN: [id number]  Admitting Diagnosis:  <principal problem not specified>  2 Days Post-Op    Recommendations:     Discharge Recommendations: rehabilitation facility  Discharge Equipment Recommendations:  walker, rolling, commode, tub bench(UE platform attachment for RW)  Barriers to discharge:  None    Assessment:   Pt w/o c/o pain this date, but limited mobility 2/2 fear & discomfort from constipation. Pt cont w/ good potential for return to PLOF w/ cont OT per POC.    Pola Shah is a 85 y.o. female with a medical diagnosis of <principal problem not specified>.  She presents with . Performance deficits affecting function are weakness, impaired endurance, gait instability, impaired functional mobilty, impaired self care skills, impaired balance, decreased coordination, decreased upper extremity function, decreased lower extremity function, pain, decreased safety awareness, decreased ROM, impaired skin, edema, orthopedic precautions.     Rehab Prognosis:  Good; patient would benefit from acute skilled OT services to address these deficits and reach maximum level of function.       Plan:     Patient to be seen 5 x/week to address the above listed problems via self-care/home management, therapeutic exercises, therapeutic activities  · Plan of Care Expires: 05/15/19  · Plan of Care Reviewed with: patient, son(DIL)    Subjective     Pain/Comfort:  · Pain Rating 1: 0/10  · Pain Rating Post-Intervention 1: 0/10    Objective:     Communicated with: nsdavid prior to session.  Patient found HOB elevated with bed alarm, telemetry upon OT entry to room.    General Precautions: Standard, fall   Orthopedic Precautions:LUE non weight bearing, LLE weight bearing as tolerated   Braces: (L UE cast)     Occupational Performance:     Bed Mobility:    · Patient completed Supine to Sit with moderate assistance and 2 persons  · Patient completed Sit to Supine with moderate  assistance and 2 persons     Functional Mobility/Transfers:  · Patient completed Sit <> Stand Transfer with minimum assistance  with  hemiwalker and platform walker   · Patient completed Toilet Transfer Step Transfer technique with moderate assistance with  bedside commode and platform walker  · Functional Mobility:     Activities of Daily Living:  · Toileting: maximal assistance on BSC      AMPAC 6 Click ADL: 14    Treatment & Education:  Pt found in supine & agreeable to OT/PT co-tx this AM w/ son & DIL acting as interpreters. Pt perf the following: sup-->EOB w/ Mod A x 2 & tyler'd ~15 min for AA/AROM B UEs 3x10 reps ea; standing via HW w/ Min A x 1, however pt unable to properly use HW; standing via platform RW w/ Min A to achieve/main f/b t/f-->BSC w/ Mod A; toileting w/ Max A. Pt returned to supine w/ Mod A x 2 due to signif sleepiness. Edu/tx re: HEP; ortho precautions, pain/edema mgmt & general safety techs. Pt/fly verbalized understanding.      Patient left HOB elevated with all lines intact, call button in reach, bed alarm on, nsg notified and fly presentEducation:      GOALS:   Multidisciplinary Problems     Occupational Therapy Goals        Problem: Occupational Therapy Goal    Goal Priority Disciplines Outcome Interventions   Occupational Therapy Goal     OT, PT/OT Ongoing (interventions implemented as appropriate)    Description:  Goals to be met by: 05/15     Patient will increase functional independence with ADLs by performing:    UE Dressing with Moderate Assistance.  LE Dressing with Moderate Assistance.  Grooming while seated with Minimal Assistance.  Toileting from toilet with Moderate Assistance for hygiene and clothing management.   Toilet transfer to toilet with Minimal Assistance.  Increased functional strength to WFL for ADLs.                      Time Tracking:     OT Date of Treatment: 04/15/19  OT Start Time: 1040  OT Stop Time: 1135  OT Total Time (min): 55 min    Billable  Minutes:Therapeutic Activity 10  Therapeutic Exercise 15  Total Time 55--co-tx w/ PT    AURY Ibarra  4/15/2019

## 2019-04-15 NOTE — PT/OT/SLP PROGRESS
Physical Therapy Treatment    Patient Name:  Pola Shah   MRN:  [id number]    Recommendations:     Discharge Recommendations:  rehabilitation facility   Discharge Equipment Recommendations: walker, rolling, commode, tub bench   Barriers to discharge: decreased functional mobility    Assessment:     Pola Shah is a 85 y.o. female admitted with a medical diagnosis of The primary encounter diagnosis was Closed fracture of left hip, initial encounter. Diagnoses of Preop examination and Closed displaced subtrochanteric fracture of left femur, initial encounter were also pertinent to this visit.    She presents with the following impairments/functional limitations:  weakness, impaired self care skills, impaired balance, decreased safety awareness, impaired endurance, impaired functional mobilty, gait instability, decreased lower extremity function.  Pt moved bed to b/s commode with platform RW with Mod assist of 1.  Pt ambulated to HOB 2' with hemiwalker and with platform RW on left with Mod assist of 1 with WBAT LLE.       Rehab Prognosis: Good; patient would benefit from acute skilled PT services to address these deficits and reach maximum level of function.    Recent Surgery: Procedure(s) (LRB):  Left hip CMN (Left) 2 Days Post-Op    Plan:     During this hospitalization, patient to be seen BID to address the identified rehab impairments via gait training, therapeutic activities, therapeutic exercises, neuromuscular re-education and progress toward the following goals:    · Plan of Care Expires:  05/13/19    Subjective     Chief Complaint: needs to have a BM  Patient/Family Comments/goals: go home  Pain/Comfort:  · Pain Rating 1: 0/10      Objective:     Communicated with nurse prior to session.  Patient found supine with bed alarm, telemetry upon PT entry to room.     General Precautions: Standard, fall   Orthopedic Precautions:LUE non weight bearing, LLE weight bearing as tolerated   Braces: (LUE cast)      Functional Mobility:  · Bed Mobility:     · Supine to Sit: moderate assistance  · Sit to Supine: moderate assistance  · Transfers:     · Sit to Stand:  moderate assistance with Left side platform RW and with hemiwalker on seperate occasions  · Gait:  Pt ambulated to HOB 2' with hemiwalker and with platform RW on left with Mod assist of 1 with WBAT LLE.     · Balance: Pt has F/F- dynamic standing balance      AM-PAC 6 CLICK MOBILITY  Turning over in bed (including adjusting bedclothes, sheets and blankets)?: 3  Sitting down on and standing up from a chair with arms (e.g., wheelchair, bedside commode, etc.): 2  Moving from lying on back to sitting on the side of the bed?: 2  Moving to and from a bed to a chair (including a wheelchair)?: 2  Need to walk in hospital room?: 2  Climbing 3-5 steps with a railing?: 1  Basic Mobility Total Score: 12       Therapeutic Activities and Exercises:   Pt sat at EOB 16 min total.    Patient left HOB elevated with all lines intact, call button in reach, bed alarm on and son and daughter in law present..    GOALS:   Multidisciplinary Problems     Physical Therapy Goals        Problem: Physical Therapy Goal    Goal Priority Disciplines Outcome Goal Variances Interventions   Physical Therapy Goal     PT, PT/OT Ongoing (interventions implemented as appropriate)     Description:  Goals to be met by: DC     Patient will increase functional independence with mobility by performin. Supine to sit with MInimal Assistance  To assess sit<>stand; gait as tolerated and establish goals.  New LTG's:  1.  Ambulate 100' with platform RW or hemiwalker with LLE WBAT and LUE WB through platform only with supervision.  2.  Bed to chair transfer with CG with LLE WBAT and LUE WB through platform walker only.              Problem: Physical Therapy Goal    Goal Priority Disciplines Outcome Goal Variances Interventions   Physical Therapy Goal     PT, PT/OT                      Time Tracking:     PT  Received On: 04/15/19  PT Start Time: 1040     PT Stop Time: 1135  PT Total Time (min): 55 min with OT    Billable Minutes: Therapeutic Activity 30    Treatment Type: Treatment  PT/PTA: PT     PTA Visit Number: 0     Joselin Majano, PT  04/15/2019

## 2019-04-15 NOTE — PROGRESS NOTES
In the event pt is discharged Tues 4/16 -   son has arranged [employer] [facility] transportation and pt has been accepted at rehab facility in [place].  Traci Madrid RN, [facility]    [phone]

## 2019-04-15 NOTE — PLAN OF CARE
Problem: Physical Therapy Goal  Goal: Physical Therapy Goal  Goals to be met by: DC     Patient will increase functional independence with mobility by performin. Supine to sit with MInimal Assistance  To assess sit<>stand; gait as tolerated and establish goals.  New LTG's:  1.  Ambulate 100' with platform RW or hemiwalker with LLE WBAT and LUE WB through platform only with supervision.  2.  Bed to chair transfer with CG with LLE WBAT and LUE WB through platform walker only.     Outcome: Ongoing (interventions implemented as appropriate)  Pt would benefit from skilled PT to progress functional mobility.

## 2019-04-15 NOTE — PLAN OF CARE
"Problem: Adult Inpatient Plan of Care  Goal: Plan of Care Review  Outcome: Ongoing (interventions implemented as appropriate)  Patient "Mrs. Shah" is AAO X 4. Vital signs stable. Had mild temperature around 12 midnight settled with Tylenol. Operated  area (Lt. Hip) and the whole leg is swollen. Post op dressing is dry and intact. Prn. Norco given with good effect. Voided freely after the removal of urinary chapa catheter. To work with PT today (Monday) Food eaten and tolerated well. Attempted once to wean her off from Oxygen but desaturated to 85% and became Tachycardic. Therefore she remains on O2 2L/mt via nasal Cannula. Slept well during night. Will continue to monitor patient.       "

## 2019-04-15 NOTE — PROGRESS NOTES
[facility] Note    Interval:   NAEO. Pain controlled. LUE splint placed yesterday. Doing well.     Vitals:  Temp:  [98.1 °F (36.7 °C)-100.6 °F (38.1 °C)] 98.1 °F (36.7 °C)  Pulse:  [71-97] 83  Resp:  [18-20] 18  SpO2:  [96 %-99 %] 97 %  BP: ()/(50-65) 110/53    Scheduled Meds:    enoxaparin  30 mg Subcutaneous Q12H    levothyroxine  50 mcg Oral Daily    losartan  25 mg Oral Daily    pravastatin  40 mg Oral Daily    senna-docusate 8.6-50 mg  1 tablet Oral BID     Continuous Infusions:   PRN Meds: acetaminophen, HYDROcodone-acetaminophen, HYDROcodone-acetaminophen, morphine, ondansetron, traMADol    Diet: Diet Vegetarian Lacto Ovo    Trended Lab Data:  Recent Labs   Lab 04/12/19  1529 04/13/19  1136   WBC 5.53 10.42   HGB 10.8* 9.2*   HCT 33.1* 28.5*    147*   MCV 94 94   RDW 13.2 13.3    138   K 4.2 3.8    104   CO2 24 24   BUN 21 13   CREATININE 0.8 0.8   * 169*   PROT 6.9  --    ALBUMIN 4.0  --    BILITOT 0.2  --    AST 18  --    ALKPHOS 58  --    ALT 15  --        I/O last 3 completed shifts:  In: 5185 [P.O.:680; I.V.:4455; IV Piggyback:50]  Out: 2420 [Urine:2420]    Exam:  NAD  No resp dist  RRR per DP    LUE:  Motor intact ain/pin/io  SILT m/u/r  BCR  Sugartong splint in place      LLE:  Surgical dressings c/d/i  DP 2+  Motor intact ta/gs/ehl/fhl  SILT t/s/s/dp/sp  No pain w/ log roll or hip circumduction    Impression:  85yF w/ L radial styloid fx, L subtroch fx POD 2 s/p L hip CMN. Doing we..    Plan:  - H/H pending  - DVT ppx: lovenox  - Abx - Ancef x 2 doses - complete  WBAT LLE, platform WB LUE  PT/OT  - Pain control    Dispo: likely tomorrow to [place]      I agree with findings outlined by the resident.

## 2019-04-15 NOTE — PROGRESS NOTES
Vital Signs      Report   View Graph      04/14 0700  04/15 0659 04/15 0700  04/15 1134  Most Recent     Temp (°F) 98.1-100.6 97.3  97.3 (36.3)  04/15 0700   Pulse 71-97 72  72  04/15 0700   Resp 18-20 20  20  04/15 0700   BP 99//65 107/57  107/57   04/15 0700   MAP (mmHg) 77-94    77  04/15 0451   SpO2 (%) 96-99 97  97  04/15 0903   Weight (kg) 70.5    70.5 kg (155 lb 6.8 oz)  04/15 0451       Medications   Report     Scheduled     Medication Last Action   enoxaparin injection 30 mg Given   30 mg, SubQ, Q12H    04/15 0945   levothyroxine tablet 50 mcg Given   50 mcg, Oral, Daily    04/15 0945   losartan tablet 25 mg Given   25 mg, Oral, Daily    04/15 0945   pravastatin tablet 40 mg Given   40 mg, Oral, Daily    04/15 0945   senna-docusate 8.6-50 mg per tablet 1 tablet Given   1 tablet, Oral, BID    04/15 0945        PRN     Medication Last Action   0.9%  NaCl infusion (for blood administration) Ordered   No Dose/Rate, IV, Q24H PRN       acetaminophen tablet 650 mg Given   650 mg, Oral, Q8H PRN    04/15 0156   HYDROcodone-acetaminophen  mg per tablet 1 tablet Given   1 tablet, Oral, Q4H PRN    04/13 1427   HYDROcodone-acetaminophen 5-325 mg per tablet 1 tablet Given   1 tablet, Oral, Q4H PRN    04/14 1945   morphine injection 3 mg Given   3 mg, IV, Q3H PRN    04/13 0451   ondansetron injection 4 mg Ordered   4 mg, IV, Q8H PRN       traMADol tablet 50 mg Given   50 mg, Oral, Q8H PRN    04/15 0654         Intake/Output      Report   View Table           92256/4587323/15    1,500  (21.3)  (0.9)  1,198.3  (17)  -301.7  --  1,198.3  (17)  1,500      I.V.    In  Out    Urine    Net        Respiratory   Report     Lab Data   (Last 24 hours)   None        O2/Vent Data   (Last 4)     04/14 1915 04/14 1939 04/15 0022 04/15 0903   SpO2 (%)  99 97 97   O2 Device (Oxygen Therapy) nasal c... nasal c... nasal c... nasal c...        Infectious Disease   Report     Temp/WBC Trend       Last 29 hours  24 Hour Max    Max  : 100.6100.6Max : 100.6Min : 97.397.3Min : 97.3  100.6 (38.1)    Temp  °F (°C)  04/14 2351        Last 29 hours  Most Recent    Max : 5.755.75Max : 5.75  5.75  WBC  K/uL  04/15 0427         Anti-Infectives     No matching active medications         Selected Labs      Report   (Last result from the past 72 hours)   Switch View       Sodium      138      04/15 0427    +          Potassium      4.1      04/15 0427    +          Chloride      103      04/15 0427    +          CO2      27      04/15 0427    +          BUN, Bld      12      04/15 0427    +          Creatinine      0.7      04/15 0427    +          Glucose      157        04/15 0427    +          Magnesium      --                 WBC      5.75      04/15 0427          Hemoglobin      6.2        04/15 0427              Hematocrit      19.3        04/15 0427        [C]          Platelets      113        04/15 0427          Coumadin Monitoring INR      1.0      04/12 1529              BUN      --                 WBC      --                 ABO      --                 Rh Type      --                 Group & Rh      B POS      04/12 1529          Lithium Screen      --                 Toxicology Information      --                 Phosphorus      --                 Lactate, Zachary      --                 BNP      --                 Troponin I      --                   History    (Up to last 2 results from the past 72 hours)                                                                                                                                                                                                                                                                                                                       Imaging   Report   (Last 72 hours)   04/13 1530 X-Ray Wrist 2 View Left  Images   04/13 1025 X-Ray Hip 2 or 3 views Left  Images   04/13 1024 X-Ray Femur 2 View Left  Images   04/13 0916 SURG FL Surgery Fluoro Usage  Images   04/12 3817  X-Ray Knee 3 View Left  Images   04/12 1828 X-Ray Femur 2 View Left  Images   04/12 1529 X-Ray Hip 1 View Left  Images       Diet Orders     Diet Vegetarian Lacto Ovo: Vegetarian starting at 04/13 1240     Lines/Drains/Airways   Report   Switch View         Lines      Wounds        Peripheral IV - Single Lumen 04/12/19 1526 20 G Right Hand  2 days    Incision/Site 04/13/19 0918 Left Hip  2 days

## 2019-04-15 NOTE — PT/OT/SLP EVAL
Occupational Therapy   Evaluation/tx    Name: Pola Shah  MRN: [id number]  Admitting Diagnosis:  <principal problem not specified> 2 Days Post-Op    Recommendations:     Discharge Recommendations: rehabilitation facility  Discharge Equipment Recommendations:  walker, amy, walker, rolling, commode, tub bench  Barriers to discharge:  None    Assessment:   Pt presents w/ decreased overall endurance/conditioning, balance/mobility & coordination w/ subsequent decline in (I)/safety w/ BADLs, fxnl mobility & fxnl t/f's.  OT 5x/wk to increase phys/fxnl status & maximize potential to achieve established goals for d/c-->IPR w/ rec HW vs RW w/ platform attachment, BSC, TTB.    Pola Shah is a 85 y.o. female with a medical diagnosis of <principal problem not specified>.  She presents with . Performance deficits affecting function: weakness, impaired endurance, gait instability, impaired functional mobilty, impaired self care skills, impaired balance, decreased lower extremity function, decreased upper extremity function, decreased coordination, decreased safety awareness, pain, decreased ROM, impaired skin, edema, impaired cardiopulmonary response to activity, orthopedic precautions.      Rehab Prognosis: Good; patient would benefit from acute skilled OT services to address these deficits and reach maximum level of function.       Plan:     Patient to be seen 5 x/week to address the above listed problems via self-care/home management, therapeutic exercises, therapeutic activities  · Plan of Care Expires: 05/15/19  · Plan of Care Reviewed with: patient    Subjective     Chief Complaint: L hip pain  Patient/Family Comments/goals: return to PLOF    Occupational Profile:  Living Environment: TBD  Previous level of function: MI  Roles and Routines: .  Equipment Used at Home:  none  Assistance upon Discharge: fly    Pain/Comfort:  ·      Patients cultural, spiritual, Shinto conflicts given the current situation:       Objective:     Communicated with: nsg prior to session.  Patient found HOB elevated with bed alarm, telemetry upon OT entry to room.    General Precautions: Standard, fall   Orthopedic Precautions:LUE non weight bearing, LLE weight bearing as tolerated   Braces:       Occupational Performance:    Bed Mobility:    · Patient completed Supine to Sit with moderate assistance, maximal assistance and 2 persons    Functional Mobility/Transfers:  · Patient completed Sit <> Stand Transfer with minimum assistance and of 2 persons  with  hand-held assist   · Patient completed Bed <> Chair Transfer using Step Transfer technique with minimum assistance and of 2 persons with hand-held assist  · Functional Mobility: via Min A x 2 w/ in & immediately outside room    Activities of Daily Living:  ·     Cognitive/Visual Perceptual:  Grossly WFL    Physical Exam:  B UEs WFL; limited L wrist AROM    Sit balance: F- to F  Stand balance: P+    AMPAC 6 Click ADL:  AMPAC Total Score:      Treatment & Education:  Pt found in supine & agreeable to OT/PT co-eval/tx this AM w/ son requesting to act as . Pt fell while dancing at wedding. Currently, pt WBAT LLE & NWBing thru L hand 2/2 dist radius fx. Pt ok'ed by Dr Shah per phone contact for L forearm/elb WBing w/ platform RW use. Pt perf the following: sup-->EOB w/ Mod-max A x 2 2/2 pt w/ post pushing from fear/pain; standing w/ Min A x 2; amb short dist w/in & immediately outside room w/ Min A thru L elbow; step t/f-->b/s chair w/ Min A thru L elb. Per phone contact w/ Dr Shah, obtained wrist immobilizer splint from ER & provided to nsg Bernice 2/2 pt needing dorsal hand IV & bracelet removed prior to application. Edu/tx re: ortho precautions, HEP & general safety techs. Pt/son verbalized understanding.    Patient left up in chair with all lines intact, call button in reach, chair alarm on, nsg notified and fly present    GOALS:   Multidisciplinary Problems     Occupational  Therapy Goals        Problem: Occupational Therapy Goal    Goal Priority Disciplines Outcome Interventions   Occupational Therapy Goal     OT, PT/OT Ongoing (interventions implemented as appropriate)    Description:  Goals to be met by: 05/15     Patient will increase functional independence with ADLs by performing:    UE Dressing with Moderate Assistance.  LE Dressing with Moderate Assistance.  Grooming while seated with Minimal Assistance.  Toileting from toilet with Moderate Assistance for hygiene and clothing management.   Toilet transfer to toilet with Minimal Assistance.  Increased functional strength to WFL for ADLs.                      History:     Past Medical History:   Diagnosis Date    Coronary artery disease     Diabetes mellitus     Borderline , not on any treatment    Hypertension     Thyroid disease        Past Surgical History:   Procedure Laterality Date    L Knee replacement Left        Time Tracking:     OT Date of Treatment: 04/14/19  OT Start Time: 0910  OT Stop Time: 0933  OT Total Time (min): 23 min    Billable Minutes:Evaluation 10  Therapeutic Activity 23  Total Time 33    AURY Ibarra  04/14/2019

## 2019-04-15 NOTE — PLAN OF CARE
Problem: Occupational Therapy Goal  Goal: Occupational Therapy Goal  Goals to be met by: 05/15     Patient will increase functional independence with ADLs by performing:    UE Dressing with Moderate Assistance.  LE Dressing with Moderate Assistance.  Grooming while seated with Minimal Assistance.  Toileting from toilet with Moderate Assistance for hygiene and clothing management.   Toilet transfer to toilet with Minimal Assistance.  Increased functional strength to WFL for ADLs.    Outcome: Ongoing (interventions implemented as appropriate)  Pt found in supine & agreeable to OT/PT co-eval/tx this AM w/ son requesting to act as . Pt fell while dancing at wedding. Currently, pt WBAT LLE & NWBing thru L hand 2/2 dist radius fx. Pt ok'ed by Dr Shah per phone contact for L forearm/elb WBing w/ platform RW use. Pt perf the following: sup-->EOB w/ Mod-max A x 2 2/2 pt w/ post pushing from fear/pain; standing w/ Min A x 2; amb short dist w/in & immediately outside room w/ Min A thru L elbow; step t/f-->b/s chair w/ Min A thru L elb. Per phone contact w/ Dr Shah, obtained wrist immobilizer splint from ER & provided to [facility] Bernice 2/2 pt needing dorsal hand IV & bracelet removed prior to application. Edu/tx re: ortho precautions, HEP & general safety techs. Pt/son verbalized understanding.    Pt presents w/ decreased overall endurance/conditioning, balance/mobility & coordination w/ subsequent decline in (I)/safety w/ BADLs, fxnl mobility & fxnl t/f's.  OT 5x/wk to increase phys/fxnl status & maximize potential to achieve established goals for d/c-->IPR w/ rec HW vs RW w/ platform attachment, BSC, TTB.

## 2019-04-15 NOTE — PT/OT/SLP PROGRESS
Physical Therapy Treatment    Patient Name:  Pola Shah   MRN:  [id number]    Recommendations:     Discharge Recommendations:  rehabilitation facility   Discharge Equipment Recommendations: commode, tub bench, walker, rolling   Barriers to discharge: None    Assessment:     Pola Shah is a 85 y.o. female admitted with a medical diagnosis of <principal problem not specified>.  She presents with the following impairments/functional limitations:  weakness, impaired endurance, impaired self care skills, impaired functional mobilty, decreased coordination, gait instability, impaired balance, decreased upper extremity function, decreased lower extremity function, decreased safety awareness, pain, impaired skin, orthopedic precautions, decreased ROM, impaired coordination, impaired cardiopulmonary response to activity. Pt able to perform ambulation training ~15 ft with HHA and mod A of 2 people. SpO2 level taken prior to session beginning and level 97% on 1L of O2. O2 taken off SpO2 levels fluctuated between 86-91% on room air. Recommending rehabilitation facility upon discharge to continue increasing pt's functional mobility. Would benefit from continued PT services while in this facility to increase pt's functional mobility to level prior to admission addressing all impairments listed above.    Rehab Prognosis: Good; patient would benefit from acute skilled PT services to address these deficits and reach maximum level of function.    Recent Surgery: Procedure(s) (LRB):  Left hip CMN (Left) 2 Days Post-Op    Plan:     During this hospitalization, patient to be seen BID(BID Mon-Fri and daily Sat and Sun) to address the identified rehab impairments via gait training, therapeutic activities, therapeutic exercises, neuromuscular re-education and progress toward the following goals:    · Plan of Care Expires:  05/13/19    Subjective     Chief Complaint: None expressed  Patient/Family Comments/goals: None  expressed  Pain/Comfort:  · Pain Rating 1: 4/10  · Location - Side 1: Left  · Location - Orientation 1: generalized  · Location 1: hip  · Pain Addressed 1: Reposition, Distraction, Cessation of Activity  · Pain Rating Post-Intervention 1: (Same as above)      Objective:     Communicated with  nurse prior to session.  Patient found supine with bed alarm, peripheral IV, telemetry upon PT entry to room.     General Precautions: Standard, fall   Orthopedic Precautions:LUE non weight bearing, LLE weight bearing as tolerated   Braces: (LUE cast)     Functional Mobility:  · Bed Mobility:     · Rolling Right: minimum assistance, moderate assistance and  with assistance to advance LLE  · Scooting: minimum assistance and  to EOB  · Supine to Sit: moderate assistance and  with assistance for advancement of trunk and LLE  · Sit to Supine: moderate assistance and with assistance at trunk and advancement of LLE  · Transfers:     · Sit to Stand:  minimum assistance, of  2 persons and  HHA secondary 2 pt receiving blood and IV in dorsal area of right hand. Putting any pressure on right hand was causing pain with hand-held assist  · Gait:  `15 ft and ~4-5 side steps with HHA and min A of 2 people      AM-PAC 6 CLICK MOBILITY  Turning over in bed (including adjusting bedclothes, sheets and blankets)?: 3  Sitting down on and standing up from a chair with arms (e.g., wheelchair, bedside commode, etc.): 2  Moving from lying on back to sitting on the side of the bed?: 2  Moving to and from a bed to a chair (including a wheelchair)?: 2  Need to walk in hospital room?: 2  Climbing 3-5 steps with a railing?: 1  Basic Mobility Total Score: 12       Therapeutic Activities and Exercises:   All bed mobility with assistance as documented above. Sat at EOB ~20-25 minutes performing weight shifting right left to increase weight bearing left hip as pt demonstrates left hip offloading. Seated therapeutic exercises LAQ's only 1 x 10 reps BLE's. BAILEY  on LLE as pt would initiate movement and clear floor with PTA assisting to complete full ROM available, RLE AROM. Pt stood with HHA with min A of 2 people performing weight shifting right to left to increase weight bearing through LLE. Ambulation training ~15 ft and ~4-5 side steps with bilateral HHA and min A of 2. HHA versus platform RW this session secondary to pt receiving blood and IV in dorsal side of right hand which was painful when attempted to hold RW handle. Ambulated with slow camilla speed, verbal cueing needed for sequencing and to increase base of support. HHA description- Pt's right and left smith's resting on PTA's and 2nd persons smith's. SpO2 levels taken throughout session see assessment for readings.    Patient left supine with all lines intact, call button in reach and  nurse notified..    GOALS:   Multidisciplinary Problems     Physical Therapy Goals        Problem: Physical Therapy Goal    Goal Priority Disciplines Outcome Goal Variances Interventions   Physical Therapy Goal     PT, PT/OT Ongoing (interventions implemented as appropriate)     Description:  Goals to be met by: DC     Patient will increase functional independence with mobility by performin. Supine to sit with MInimal Assistance  To assess sit<>stand; gait as tolerated and establish goals.  New LTG's:  1.  Ambulate 100' with platform RW or hemiwalker with LLE WBAT and LUE WB through platform only with supervision.  2.  Bed to chair transfer with CG with LLE WBAT and LUE WB through platform walker only.              Problem: Physical Therapy Goal    Goal Priority Disciplines Outcome Goal Variances Interventions   Physical Therapy Goal     PT, PT/OT                      Time Tracking:     PT Received On: 04/15/19  PT Start Time: 1350     PT Stop Time: 1446  PT Total Time (min): 56 min     Billable Minutes: Gait Training  25, Therapeutic Activity  15 and Therapeutic Exercise  16    Treatment Type: Treatment  PT/PTA: PTA      PTA Visit Number: 1     Ramya Palma, PTA  04/15/2019

## 2019-04-15 NOTE — PROGRESS NOTES
Pola Shah #[id number] (CSN: [id number]) (85 y.o. F) (Adm: 04/12/19)   [facility] [id number]   PCP     None   Date of Birth     2/20/1934   Demographics     Address: Home Phone: Work Phone: Mobile Phone:     [address]  [facility] [address] [phone]    SSN: Insurance: Marital Status: Faith:      MEDICARE  Orthodox    Admission Dx      Preop examination    Closed fracture of left hip, initial encounter    Closed displaced subtrochanteric fracture of left femur, initial encounter    Closed fracture of left hip, initial encounter   Chief Complaint     Complaint Comment   Hip Pain left hip injury after fall at a hotel. + shortening noted. pt immobilized pta.    Documents Filed to Patient     Power of  Living Will Clinical Unknown Study Attachment Consent Form ABN Waiver After Visit Summary Lab Result Scan Code Status Patient Portal Status   Not on File Not on File Not on File Not on File Not on File Not on File Not on File Not on File FULL [Updated on 04/12/19 1741] Inactive   Auth/Cert Information      Open Auth/Cert for Hospital Account [id number]      Admission Information     Attending Provider Admitting Provider Admission Type Admission Date/Time   MD Justus Lee MD Emergency 04/12/19  1423   Discharge Date Hospital Service Auth/Cert Status Service Area    Orthopedic Surgery Incomplete Hospitals in [place]   Unit Room/Bed Admission Status    [facility] MEDICAL SURGICAL UNIT ACUTE K533/K533 A Admission (Confirmed)    Hospital Account     Name Acct ID Class Status Primary Coverage   Pola Shah [id number] IP- Inpatient Open MEDICARE - MEDICARE PART A & B          Guarantor Account (for Hospital Account #[id number])     Name Relation to Pt Service Area Active? Acct Type   Pola Shah Self OHSSA Yes Personal/Family   Address Phone     [address] [place] [address] [phone](H)            Coverage Information (for Hospital  Account #[id number])     1. MEDICARE/MEDICARE PART A & B     F/O Payor/Plan Precert #   MEDICARE/MEDICARE PART A & B    Subscriber Subscriber #   PabloPola [id number]   Address Phone   [address]  [place] [address] [phone]   2. [facility]/MEDICARE SUPPLEMENT [facility] AAR     F/O Payor/Plan Precert #   [facility]/MEDICARE SUPPLEMENT [facility] AARP    Subscriber Subscriber #   CastilloPola [id number]   Address Phone   [address]  [place] [address]           Emergency Contact Information     Name: castillo ,ihsan Relationship: Daughter   Address:     City:  State:  Zip:  Phone:     Business phone:

## 2019-04-15 NOTE — PROGRESS NOTES
[facility]:  ([facility] Amb[facility])    Call  [phone]  [phone]    -------------------------------------------------    [facility]:    per [facility]:    [facility] (dispatches flight care)    [phone]   -- Anthony Rossi

## 2019-04-15 NOTE — DOWNTIME EVENT NOTE
The EMR was down from 0200 to 1830 on 4/14/2019.    Bernice was responsible for completing the paper charting during this time period.     Refer to the manual downtime form/flowsheet for documentation during this time period.    Bernice Schultz  4/14/2019

## 2019-04-16 VITALS
OXYGEN SATURATION: 93 % | TEMPERATURE: 98 F | WEIGHT: 155.44 LBS | RESPIRATION RATE: 18 BRPM | BODY MASS INDEX: 28.61 KG/M2 | DIASTOLIC BLOOD PRESSURE: 69 MMHG | SYSTOLIC BLOOD PRESSURE: 156 MMHG | HEART RATE: 77 BPM | HEIGHT: 62 IN

## 2019-04-16 LAB
ANION GAP SERPL CALC-SCNC: 9 MMOL/L (ref 8–16)
BASOPHILS # BLD AUTO: 0.02 K/UL (ref 0–0.2)
BASOPHILS NFR BLD: 0.3 % (ref 0–1.9)
BUN SERPL-MCNC: 13 MG/DL (ref 8–23)
CALCIUM SERPL-MCNC: 8.7 MG/DL (ref 8.7–10.5)
CHLORIDE SERPL-SCNC: 100 MMOL/L (ref 95–110)
CO2 SERPL-SCNC: 27 MMOL/L (ref 23–29)
CREAT SERPL-MCNC: 0.7 MG/DL (ref 0.5–1.4)
DIFFERENTIAL METHOD: ABNORMAL
EOSINOPHIL # BLD AUTO: 0.2 K/UL (ref 0–0.5)
EOSINOPHIL NFR BLD: 2.4 % (ref 0–8)
ERYTHROCYTE [DISTWIDTH] IN BLOOD BY AUTOMATED COUNT: 14.4 % (ref 11.5–14.5)
EST. GFR  (AFRICAN AMERICAN): >60 ML/MIN/1.73 M^2
EST. GFR  (NON AFRICAN AMERICAN): >60 ML/MIN/1.73 M^2
GLUCOSE SERPL-MCNC: 129 MG/DL (ref 70–110)
HCT VFR BLD AUTO: 30.4 % (ref 37–48.5)
HGB BLD-MCNC: 10.2 G/DL (ref 12–16)
LYMPHOCYTES # BLD AUTO: 1.1 K/UL (ref 1–4.8)
LYMPHOCYTES NFR BLD: 16.2 % (ref 18–48)
MCH RBC QN AUTO: 30.1 PG (ref 27–31)
MCHC RBC AUTO-ENTMCNC: 33.6 G/DL (ref 32–36)
MCV RBC AUTO: 90 FL (ref 82–98)
MONOCYTES # BLD AUTO: 0.8 K/UL (ref 0.3–1)
MONOCYTES NFR BLD: 11 % (ref 4–15)
NEUTROPHILS # BLD AUTO: 4.7 K/UL (ref 1.8–7.7)
NEUTROPHILS NFR BLD: 68.8 % (ref 38–73)
PLATELET # BLD AUTO: 132 K/UL (ref 150–350)
PMV BLD AUTO: 9 FL (ref 9.2–12.9)
POCT GLUCOSE: 133 MG/DL (ref 70–110)
POTASSIUM SERPL-SCNC: 4.3 MMOL/L (ref 3.5–5.1)
RBC # BLD AUTO: 3.39 M/UL (ref 4–5.4)
SODIUM SERPL-SCNC: 136 MMOL/L (ref 136–145)
WBC # BLD AUTO: 6.8 K/UL (ref 3.9–12.7)

## 2019-04-16 PROCEDURE — 85025 COMPLETE CBC W/AUTO DIFF WBC: CPT

## 2019-04-16 PROCEDURE — 63600175 PHARM REV CODE 636 W HCPCS: Performed by: ORTHOPAEDIC SURGERY

## 2019-04-16 PROCEDURE — 97530 THERAPEUTIC ACTIVITIES: CPT

## 2019-04-16 PROCEDURE — 36415 COLL VENOUS BLD VENIPUNCTURE: CPT

## 2019-04-16 PROCEDURE — 94760 N-INVAS EAR/PLS OXIMETRY 1: CPT

## 2019-04-16 PROCEDURE — 97116 GAIT TRAINING THERAPY: CPT

## 2019-04-16 PROCEDURE — 25000003 PHARM REV CODE 250: Performed by: ORTHOPAEDIC SURGERY

## 2019-04-16 PROCEDURE — 80048 BASIC METABOLIC PNL TOTAL CA: CPT

## 2019-04-16 RX ORDER — ONDANSETRON 2 MG/ML
4 INJECTION INTRAMUSCULAR; INTRAVENOUS EVERY 8 HOURS PRN
Start: 2019-04-16

## 2019-04-16 RX ORDER — DOCUSATE SODIUM 100 MG/1
100 CAPSULE, LIQUID FILLED ORAL 2 TIMES DAILY
Refills: 0 | COMMUNITY
Start: 2019-04-16

## 2019-04-16 RX ADMIN — PRAVASTATIN SODIUM 40 MG: 40 TABLET ORAL at 10:04

## 2019-04-16 RX ADMIN — LOSARTAN POTASSIUM 25 MG: 25 TABLET ORAL at 08:04

## 2019-04-16 RX ADMIN — ENOXAPARIN SODIUM 30 MG: 100 INJECTION SUBCUTANEOUS at 08:04

## 2019-04-16 RX ADMIN — LEVOTHYROXINE SODIUM 50 MCG: 50 TABLET ORAL at 08:04

## 2019-04-16 RX ADMIN — ACETAMINOPHEN 650 MG: 325 TABLET ORAL at 10:04

## 2019-04-16 NOTE — PLAN OF CARE
Problem: Physical Therapy Goal  Goal: Physical Therapy Goal  Goals to be met by: DC     Patient will increase functional independence with mobility by performin. Supine to sit with MInimal Assistance  To assess sit<>stand; gait as tolerated and establish goals.  New LTG's:  1.  Ambulate 100' with platform RW or hemiwalker with LLE WBAT and LUE WB through platform only with supervision.  2.  Bed to chair transfer with CG with LLE WBAT and LUE WB through platform walker only.      Outcome: Ongoing (interventions implemented as appropriate)  Pt making good progress with functional mobility and activity tolerance as she ambulated 15 ft with platform RW and min A. Pt d/c to IPR following session.

## 2019-04-16 NOTE — PLAN OF CARE
[facility]    FACILITY TRANSFER ORDERS      Patient Name: Pola Shah  YOB: 1934    PCP: No primary care provider on file.   PCP Address: No primary physician on file.  PCP Phone Number: None  PCP Fax: None    Encounter Date: 04/16/2019    Admit to: Rehab facility in [place]    Vital Signs:  Routine    Diagnoses:   Active Hospital Problems    Diagnosis  POA    *Closed fracture of left hip [S72.002A]  Yes     Priority: 24       Resolved Hospital Problems   No resolved problems to display.       Allergies:Review of patient's allergies indicates:  No Known Allergies    Diet: regular diet    Activities: Platform WB LUE, WBAT LLE    Nursing: none     Labs: CBC weekly     CONSULTS:    Physical Therapy to evaluate and treat.  and Occupational Therapy to evaluate and treat.    MISCELLANEOUS CARE:  dressings down 1 week post-op. Keep incisions clean and dry w/ tape and gauze. Needs to be seen by orthopaedic surgeon for L distal radius fx this week.    WOUND CARE ORDERS  Yes: Surgical Wound:  Location: LLE    Consult ET nurse        Apply the following to wound:   Other: dressing down in 1 week. Keep incisions clean and dry (frequency)    Medications: Review discharge medications with patient and family and provide education.      Current Discharge Medication List      START taking these medications    Details   docusate sodium (COLACE) 100 MG capsule Take 1 capsule (100 mg total) by mouth 2 (two) times daily.  Refills: 0      enoxaparin (LOVENOX) 40 mg/0.4 mL Syrg Inject 0.4 mLs (40 mg total) into the skin once daily. for 14 days  Qty: 1 Syringe, Refills: 0      HYDROcodone-acetaminophen (NORCO) 5-325 mg per tablet Take 1 tablet by mouth every 6 (six) hours as needed.  Qty: 28 tablet, Refills: 0      ondansetron 4 mg/2 mL Soln Inject 4 mg into the vein every 8 (eight) hours as needed.      traMADol (ULTRAM) 50 mg tablet Take 1 tablet (50 mg total) by mouth every 8 (eight) hours as  needed.  Qty: 30 tablet, Refills: 0         CONTINUE these medications which have NOT CHANGED    Details   C,E,zinc,copper 11/uizrw9x/lut (OCUVITE ADULT 50 PLUS ORAL) Take 2 tablets by mouth 2 (two) times daily.      calcium carbonate (OS-YANETH) 600 mg calcium (1,500 mg) Tab Take 600 mg by mouth once.      levothyroxine (SYNTHROID) 50 MCG tablet Take 50 mcg by mouth once daily.      olmesartan (BENICAR) 20 MG tablet Take 10 mg by mouth once daily.      pravastatin (PRAVACHOL) 40 MG tablet Take 40 mg by mouth once daily.                  _________________________________  Hardik Shah MD  04/16/2019

## 2019-04-16 NOTE — PLAN OF CARE
TN met with pt and son Dr. MARCELLA Shah prior to d/c   [facility] to pick pt up for airflight to [facility] in [place]   pt's nurse called  to give report   final report called to facility        04/16/19 2883   Final Note   Assessment Type Final Discharge Note   Anticipated Discharge Disposition Rehab  ([place], [place]  )   What phone number can be called within the next 1-3 days to see how you are doing after discharge? [phone]   Hospital Follow Up  Appt(s) scheduled? No   Discharge plans and expectations educations in teach back method with documentation complete? Yes   Right Care Referral Info   Post Acute Recommendation SNF / Sub-Acute Rehab   Referral Type   (IPR )   Facility Name   ([place], [place]  )

## 2019-04-16 NOTE — PROGRESS NOTES
"TN rec'd call from [facility] nurse    he is awaiting ok to  this pt to fly to [place]     TN faxed orders, last notes to [facility] --- [phone]   awaiting "ok" for our nurse to call report then have pt transported.       TN will update pt's son Dr. MARCELLA Shah.     TN spoke with Candida at the facility -- she is aware that final orders are being faxed to her now.    "

## 2019-04-16 NOTE — PLAN OF CARE
Problem: Adult Inpatient Plan of Care  Goal: Plan of Care Review  Outcome: Ongoing (interventions implemented as appropriate)  Pt on RA with documented sats.93. Will continue to monitor.

## 2019-04-16 NOTE — PROGRESS NOTES
[facility] Ortho Note    Interval:   NAEO. Pain controlled. Received 2 units PRBC yesterday for low H/h. Doing well. Ready to go back to [place]    Vitals:  Temp:  [96.7 °F (35.9 °C)-99.9 °F (37.7 °C)] 98.1 °F (36.7 °C)  Pulse:  [77-97] 77  Resp:  [16-20] 18  SpO2:  [88 %-100 %] 94 %  BP: (127-156)/(58-82) 156/69    Scheduled Meds:    docusate sodium  100 mg Oral BID    enoxaparin  30 mg Subcutaneous Q12H    levothyroxine  50 mcg Oral Daily    losartan  25 mg Oral Daily    pravastatin  40 mg Oral Daily    senna-docusate 8.6-50 mg  1 tablet Oral BID     Continuous Infusions:   PRN Meds: sodium chloride, acetaminophen, HYDROcodone-acetaminophen, HYDROcodone-acetaminophen, morphine, ondansetron, traMADol    Diet: Diet Vegetarian Lacto Ovo    Trended Lab Data:  Recent Labs   Lab 04/12/19  1529 04/13/19  1136 04/15/19  0427 04/15/19  2101 04/16/19  0516   WBC 5.53 10.42 5.75 7.42 6.80   HGB 10.8* 9.2* 6.2* 9.4* 10.2*   HCT 33.1* 28.5* 19.3* 28.2* 30.4*    147* 113* 126* 132*   MCV 94 94 94 90 90   RDW 13.2 13.3 13.5 14.4 14.4    138 138  137  --  136   K 4.2 3.8 4.1  4.2  --  4.3    104 103  103  --  100   CO2 24 24 27  26  --  27   BUN 21 13 12  12  --  13   CREATININE 0.8 0.8 0.7  0.7  --  0.7   * 169* 157*  156*  --  129*   PROT 6.9  --   --   --   --    ALBUMIN 4.0  --   --   --   --    BILITOT 0.2  --   --   --   --    AST 18  --   --   --   --    ALKPHOS 58  --   --   --   --    ALT 15  --   --   --   --        I/O last 3 completed shifts:  In: 661.3 [P.O.:400; Blood:261.3]  Out: 1001 [Urine:1000; Stool:1]    Exam:  NAD  No resp dist  RRR per DP    LUE:  Motor intact ain/pin/io  SILT m/u/r  BCR  Sugartong splint in place      LLE:  Surgical dressings c/d/i  DP 2+  Motor intact ta/gs/ehl/fhl  SILT t/s/s/dp/sp  No pain w/ log roll or hip circumduction    Impression:  85yF w/ L radial styloid fx, L subtroch fx POD 3 s/p L hip CMN. Doing well.    Plan:  - H/H 10.2/30.4  - DVT ppx: lovenox  -  Abx - Ancef x 2 doses - complete  WBAT LLE, platform WB LUE  PT/OT  - Pain control    Dispo: Discharge today to [place] rehab    I agree with findings outlined by the resident.

## 2019-04-16 NOTE — NURSING
Report called to Leigh at [facility] in [place]. All questions answered with no questions at this time. [facility] flight care here to  pt to transport to rehab facility. Packet given to RN nurse.  Rn requested for IV to left hand to stay in due to IV needed for transport. RN stated he would remove once pt safely transport to facility.

## 2019-04-16 NOTE — PT/OT/SLP DISCHARGE
Physical Therapy Discharge Summary    Name: Pola Shah  MRN: [id number]   Principal Problem: Closed fracture of left hip     Patient Discharged from acute Physical Therapy on 19.  Please refer to prior PT noted date on 19 for functional status.     Assessment:     Patient appropriate for care in another setting.    Objective:     GOALS:   Multidisciplinary Problems     Physical Therapy Goals        Problem: Physical Therapy Goal    Goal Priority Disciplines Outcome Goal Variances Interventions   Physical Therapy Goal     PT, PT/OT Ongoing (interventions implemented as appropriate)     Description:  Goals to be met by: DC     Patient will increase functional independence with mobility by performin. Supine to sit with MInimal Assistance  To assess sit<>stand; gait as tolerated and establish goals.  New LTG's:  1.  Ambulate 100' with platform RW or hemiwalker with LLE WBAT and LUE WB through platform only with supervision.  2.  Bed to chair transfer with CG with LLE WBAT and LUE WB through platform walker only.              Problem: Physical Therapy Goal    Goal Priority Disciplines Outcome Goal Variances Interventions   Physical Therapy Goal     PT, PT/OT                      Reasons for Discontinuation of Therapy Services  Transfer to alternate level of care.      Plan:     Patient Discharged to: Inpatient Rehab.    Judith Anton, PT  2019

## 2019-04-16 NOTE — PT/OT/SLP PROGRESS
Physical Therapy Treatment    Patient Name:  Pola Shah   MRN:  [id number]    Recommendations:     Discharge Recommendations:  rehabilitation facility   Discharge Equipment Recommendations: (defer to IPR)   Barriers to discharge: requires increased assist for all functional mobility     Assessment:     Pola Shah is a 85 y.o. female admitted with a medical diagnosis of Closed fracture of left hip.  She presents with the following impairments/functional limitations:  weakness, impaired endurance, impaired self care skills, impaired functional mobilty, gait instability, impaired balance, decreased lower extremity function, decreased ROM, decreased upper extremity function, edema, orthopedic precautions. Pt making good progress with functional mobility and activity tolerance as she ambulated 15 ft with platform RW and min A. Pt d/c to IPR following session.     Rehab Prognosis: Good; patient would benefit from acute skilled PT services to address these deficits and reach maximum level of function.    Recent Surgery: Procedure(s) (LRB):  Left hip CMN (Left) 3 Days Post-Op    Plan:     During this hospitalization, patient to be seen BID to address the identified rehab impairments via gait training, therapeutic activities, therapeutic exercises, neuromuscular re-education and progress toward the following goals:    · Plan of Care Expires:  05/13/19    Subjective     Chief Complaint: having to use bathroom  Patient/Family Comments/goals: pt's son and daughter-in-law present and translated throughout session. Pt's family deferred use of  services.  Pain/Comfort:  · Pain Rating 1: 0/10(reporting no pain at rest and states no pain with gait but fear of pain)  · Pain Rating Post-Intervention 1: 0/10      Objective:     Communicated with AURELIANO Maldonado prior to session.  Patient found HOB elevated with bed alarm, telemetry upon PT entry to room.     General Precautions: Standard, fall   Orthopedic Precautions:LLE  weight bearing as tolerated(LUE WB through platform, NWB through wrist)   Braces: (LUE cast)     Functional Mobility:  · Bed Mobility:     · Scooting: contact guard assistance  · Supine to Sit: moderate assistance  · Transfers:     · Sit to Stand:  minimum assistance with hemiwalker  · Bed to Chair: minimum assistance with  hemiwalker  using  Step Transfer  · Toilet Transfer: minimum assistance with  hemiwalker  using  Stand Pivot  · Gait: 15 ft with L hemiwalker and min A with assist to propel RW and max cues for 3-point gait pattern and pursed lip breathing throughout.      AM-PAC 6 CLICK MOBILITY  Turning over in bed (including adjusting bedclothes, sheets and blankets)?: 3  Sitting down on and standing up from a chair with arms (e.g., wheelchair, bedside commode, etc.): 3  Moving from lying on back to sitting on the side of the bed?: 3  Moving to and from a bed to a chair (including a wheelchair)?: 3  Need to walk in hospital room?: 3  Climbing 3-5 steps with a railing?: 1  Basic Mobility Total Score: 16       Therapeutic Activities and Exercises:  Pt completed SPT from bed>BSC, urinated on BSC, then required assist for hygiene in standing following return to sit EOB.  Pt then ambulated as reported above, pt's daughter-in-law following with chair and PT providing max cues as reported above. Increased time to ambulate above distance.  Pt left up in chair with LEs reclined and educated to complete APs and QS/GS.  Educated to call and wait for staff assist when ready to transfer back to bed.    Patient left up in chair with all lines intact, call button in reach, chair alarm on, RN notified and pt's family present..    GOALS:   Multidisciplinary Problems     Physical Therapy Goals        Problem: Physical Therapy Goal    Goal Priority Disciplines Outcome Goal Variances Interventions   Physical Therapy Goal     PT, PT/OT Ongoing (interventions implemented as appropriate)     Description:  Goals to be met by: MALA      Patient will increase functional independence with mobility by performin. Supine to sit with MInimal Assistance  To assess sit<>stand; gait as tolerated and establish goals.  New LTG's:  1.  Ambulate 100' with platform RW or hemiwalker with LLE WBAT and LUE WB through platform only with supervision.  2.  Bed to chair transfer with CG with LLE WBAT and LUE WB through platform walker only.              Problem: Physical Therapy Goal    Goal Priority Disciplines Outcome Goal Variances Interventions   Physical Therapy Goal     PT, PT/OT                      Time Tracking:     PT Received On: 19  PT Start Time: 850     PT Stop Time: 929  PT Total Time (min): 39 min     Billable Minutes: Gait Training 15 and Therapeutic Activity 24    Treatment Type: Treatment  PT/PTA: PT     PTA Visit Number: 0     Judith Anton, PT  2019

## 2019-04-17 NOTE — DISCHARGE SUMMARY
Physician Discharge Summary     Patient ID:      Admit date: 4/12/19    Discharge date: 4/16/19    Admitting Physician: Justus Vences MD    Discharge Physician: Same    Admission Diagnoses: L subtroch hip fx    Discharge Diagnoses: L subtroch fx, L DR fx    Admission Condition: stable    Discharged Condition: stable    Indication for Admission: L hip fx    Hospital Course: Patient presented to [facility] ED and noted to have a L hip fx. Patient underwent a L CMN, please see operative report for further detail. Patient did well postoperatively and was noted to have a L distal radius fx on tertiary survey. She was found to be fit for discharge on POD# 3. Toya did receive 2 units PRBC for a low H/H on POD 2. Discharged to rehab in [place].      Consults: None    Significant Diagnostic Studies: none    Treatments: surgery: L CMN    Discharge Exam:  NAD  No resp dist  RRR per DP     LUE:  Motor intact ain/pin/io  SILT m/u/r  BCR  Sugartong splint in place        LLE:  Surgical dressings c/d/i  DP 2+  Motor intact ta/gs/ehl/fhl  SILT t/s/s/dp/sp  No pain w/ log roll or hip circumduction        Disposition: Home or Self Care    Patient Instructions:     Discharge Medications  Refer to Discharge Medication List    Activity: Platform WB LUE, WBAT LLE  Diet: regular diet  Wound Care: keep wound clean and dry    Discussed plan with patient and answered questions: yes

## 2019-04-18 NOTE — PHYSICIAN QUERY
"PT Name: Pola Shah  MR #: [id number]    Physician Query Form - Hematology Clarification      CDS/: Krissy MILLER, RN               Contact information: [email or web address]    This form is a permanent document in the medical record.      Query Date: April 18, 2019    By submitting this query, we are merely seeking further clarification of documentation. Please utilize your independent clinical judgment when addressing the question(s) below.    The Medical record contains the following:   Indicators  Supporting Clinical Findings Location in Medical Record    "Anemia" documented      x H & H =  Hemoglobin = 10.8 --> 6.2   Hematocrit = 33.1--> 19.3  Lab: Hematology 4/12/19    BP =                     HR=      "GI bleeding" documented      x Acute bleeding (Non GI site)  Estimated Blood Loss: 100cc  Orthopedic Surgery Op note 4/13/19    x Transfusion(s)  Toya did receive 2 units PRBC for a low H/H on POD 2.  Discharge Summary 4/17/19    Treatment:      Other:        Provider, please specify diagnosis or diagnoses associated with above clinical findings.    [  ] Acute blood loss anemia expected post-operatively   [ X ] Acute blood loss anemia   [  ] Precipitous drop in Hematocrit     [  ] Other Hematological Diagnosis (please specify):     [  ] Clinically Undetermined       Please document in your progress notes daily for the duration of treatment, until resolved, and include in your discharge summary.                                                                                                      "

## 2019-04-18 NOTE — PHYSICIAN QUERY
PT Name: Pola Shah  MR #: [id number]     Physician Query Form - Documentation Clarification      CDS/: Krissy MILLER, RN               Contact information: [email or web address]    This form is a permanent document in the medical record.     Query Date: April 18, 2019    By submitting this query, we are merely seeking further clarification of documentation. Please utilize your independent clinical judgment when addressing the question(s) below.    The Medical record reflects the following:    Supporting Clinical Findings Location in Medical Record      Pre-Op Diagnosis: Left Subtrochanteric hip fracture     Post-Op Diagnosis: same     Procedure: Left hip cephlomedullary nail       Orthopedic Surgery Op note 4/13/19      FINDINGS:  There is a comminuted intertrochanteric fracture.     Impression   Redemonstration of an acute, comminuted, impacted and angulated fracture of the left intertrochanteric region grossly similar to left hip series earlier same day.      XR Hip 1 View Left 4/12/19       XR Femur 2 View Left 4/12/19   Doctor, Please specify diagnosis or diagnoses associated with above clinical findings.     Due to conflicting documentation, please specify the location of the hip fracture. Thank you.    Provider Use Only       [  ] Acute, Comminuted, Impacted fracture of the Left Intertrochanteric region     [ X] Left Subtrochanteric Hip Fracture     [  ] Other related diagnosis (please specify): _____________________________                                                                                                                 [  ] Clinically Undetermined

## 2019-04-18 NOTE — PHYSICIAN QUERY
PT Name: Pola Shah  MR #: [id number]    Physician Query Form -Present on Admission (POA) Diagnosis Clarification     CDS/: Krissy MILLER, RN              Contact information: [email or web address]     This form is a permanent document in the medical record.     Query Date: April 18, 2019    By submitting this query, we are merely seeking further clarification of documentation. Please utilize your independent clinical judgment when addressing the question(s) below.       The Medical record contains the following:    Diagnosis      Supporting Clinical Information   Location in Medical Record      L distal radius fx           Patient did well postoperatively and was noted to have a L distal radius fx on tertiary survey.        FINDINGS:  Detail obscured by external artifact.  There is a nondisplaced fracture of the anterior distal left radial metaphyseal region with probable intra-articular extension into the radiocarpal joint.        Discharge Summary 4/17/19     XR WRIST 2 VIEW LEFT     Doctor, Please specify Present On Admission (POA) status of ___L distal radius fx ____.    [ X ] Present on Admission    [  ] Not Present on Admission   [  ] Clinically Undetermined

## 2023-02-28 NOTE — PLAN OF CARE
Problem: Adult Inpatient Plan of Care  Goal: Plan of Care Review  Outcome: Ongoing (interventions implemented as appropriate)  Patient on RA with sats as documented.  Will continue to monitor.         Concentration Of Solution Injected (Mg/Ml): 5.0

## (undated) DEVICE — GAUZE SPONGE 4X4 12PLY

## (undated) DEVICE — BLADE SURG CARBON STEEL #10

## (undated) DEVICE — GOWN SURGICAL XX LARGE X LONG

## (undated) DEVICE — TRAY FOLEY 16FR INFECTION CONT

## (undated) DEVICE — SEE MEDLINE ITEM 152530

## (undated) DEVICE — BIT DRILL 4.2MM X 130MM

## (undated) DEVICE — DRAPE STERI U-SHAPED 47X51IN

## (undated) DEVICE — GUIDEWIRE 3.2 X 450
Type: IMPLANTABLE DEVICE | Site: HIP | Status: NON-FUNCTIONAL
Removed: 2019-04-13

## (undated) DEVICE — GAUZE SPONGE 4X4 12 PLY NS

## (undated) DEVICE — GLOVE 8 PROTEXIS PI BLUE

## (undated) DEVICE — SUPPORT ULNA NERVE PROTECTOR

## (undated) DEVICE — APPLICATOR CHLORAPREP ORN 26ML

## (undated) DEVICE — REAMER MOD BIXCUT 8X48MM STER.

## (undated) DEVICE — DRAPE STERI-DRAPE 83X125 IOBAN

## (undated) DEVICE — SEE MEDLINE ITEM 157117

## (undated) DEVICE — GUIDE WIRE 3.0X1000MM BALL TIP
Type: IMPLANTABLE DEVICE | Site: HIP | Status: NON-FUNCTIONAL
Removed: 2019-04-13

## (undated) DEVICE — GLOVE 8 PROTEXIS PI ORTHO

## (undated) DEVICE — PACK BASIC

## (undated) DEVICE — DRESSING TEGADERM 4.4X5IN

## (undated) DEVICE — PADDING CAST SPECIALIST 6X4YD

## (undated) DEVICE — SPONGE LAP 18X18 PREWASHED

## (undated) DEVICE — SEE MEDLINE ITEM 152622

## (undated) DEVICE — SEE MEDLINE ITEM 146345

## (undated) DEVICE — SEE MEDLINE ITEM 156955

## (undated) DEVICE — SUT VICRYL 1 CT-1 27 UNDIE

## (undated) DEVICE — SEE MEDLINE ITEM 157116

## (undated) DEVICE — WIRE KIRSCHNER T2 3X285MM SS
Type: IMPLANTABLE DEVICE | Site: HIP | Status: NON-FUNCTIONAL
Removed: 2019-04-13

## (undated) DEVICE — ELECTRODE REM PLYHSV RETURN 9

## (undated) DEVICE — PAD ABDOMINAL 5X9 STERILE

## (undated) DEVICE — SEE MEDLINE ITEM 146292

## (undated) DEVICE — DRESSING AQUACEL SACRAL 9 X 9

## (undated) DEVICE — COVER OVERHEAD SURG LT BLUE

## (undated) DEVICE — DRESSING XEROFORM FOIL PK 1X8

## (undated) DEVICE — SUT VICRYL 2 0 CT 2

## (undated) DEVICE — MANIFOLD 4 PORT